# Patient Record
Sex: MALE | Race: OTHER | NOT HISPANIC OR LATINO | URBAN - METROPOLITAN AREA
[De-identification: names, ages, dates, MRNs, and addresses within clinical notes are randomized per-mention and may not be internally consistent; named-entity substitution may affect disease eponyms.]

---

## 2023-07-16 ENCOUNTER — EMERGENCY (EMERGENCY)
Facility: HOSPITAL | Age: 53
LOS: 0 days | Discharge: HOME | End: 2023-07-16
Attending: EMERGENCY MEDICINE
Payer: MEDICARE

## 2023-07-16 VITALS
DIASTOLIC BLOOD PRESSURE: 105 MMHG | HEART RATE: 85 BPM | TEMPERATURE: 98 F | OXYGEN SATURATION: 99 % | RESPIRATION RATE: 20 BRPM | SYSTOLIC BLOOD PRESSURE: 170 MMHG

## 2023-07-16 DIAGNOSIS — Z87.19 PERSONAL HISTORY OF OTHER DISEASES OF THE DIGESTIVE SYSTEM: ICD-10-CM

## 2023-07-16 DIAGNOSIS — M54.50 LOW BACK PAIN, UNSPECIFIED: ICD-10-CM

## 2023-07-16 DIAGNOSIS — S39.012A STRAIN OF MUSCLE, FASCIA AND TENDON OF LOWER BACK, INITIAL ENCOUNTER: ICD-10-CM

## 2023-07-16 DIAGNOSIS — Z87.39 PERSONAL HISTORY OF OTHER DISEASES OF THE MUSCULOSKELETAL SYSTEM AND CONNECTIVE TISSUE: ICD-10-CM
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
## 2023-07-16 DIAGNOSIS — M43.06 SPONDYLOLYSIS, LUMBAR REGION: ICD-10-CM
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
## 2023-07-16 PROCEDURE — 99283 EMERGENCY DEPT VISIT LOW MDM: CPT | Mod: 25

## 2023-07-16 PROCEDURE — 72110 X-RAY EXAM L-2 SPINE 4/>VWS: CPT

## 2023-07-16 PROCEDURE — 99284 EMERGENCY DEPT VISIT MOD MDM: CPT | Mod: GC

## 2023-07-16 PROCEDURE — 72110 X-RAY EXAM L-2 SPINE 4/>VWS: CPT | Mod: 26

## 2023-07-16 RX ORDER — METHOCARBAMOL 500 MG/1
1500 TABLET, FILM COATED ORAL ONCE
Refills: 0 | Status: DISCONTINUED | OUTPATIENT
Start: 2023-07-16 | End: 2023-07-16

## 2023-07-16 RX ORDER — KETOROLAC TROMETHAMINE 30 MG/ML
1 SYRINGE (ML) INJECTION
Qty: 14 | Refills: 0
Start: 2023-07-16 | End: 2023-07-22

## 2023-07-16 RX ORDER — LIDOCAINE 4 G/100G
1 CREAM TOPICAL ONCE
Refills: 0 | Status: DISCONTINUED | OUTPATIENT
Start: 2023-07-16 | End: 2023-07-16

## 2023-07-16 RX ORDER — KETOROLAC TROMETHAMINE 30 MG/ML
15 SYRINGE (ML) INJECTION ONCE
Refills: 0 | Status: COMPLETED | OUTPATIENT
Start: 2023-07-16 | End: 2023-07-16

## 2023-07-16 RX ORDER — METHOCARBAMOL 500 MG/1
2 TABLET, FILM COATED ORAL
Qty: 28 | Refills: 0
Start: 2023-07-16 | End: 2023-07-22

## 2023-07-16 RX ORDER — MENTHOL 16 G/100G
1 CREAM TOPICAL
Qty: 1 | Refills: 0
Start: 2023-07-16 | End: 2023-07-22

## 2023-07-16 RX ORDER — ACETAMINOPHEN 500 MG
975 TABLET ORAL ONCE
Refills: 0 | Status: DISCONTINUED | OUTPATIENT
Start: 2023-07-16 | End: 2023-07-16

## 2023-07-16 NOTE — ED PROVIDER NOTE - ATTENDING CONTRIBUTION TO CARE
52yM spondylolysis s/p repair 5/10 at [facility] p/w L lower back pain occ radiating down his L leg.  No fever, incontinence or saddle anesthesia.  No new traumatic injury or heavy lifting.

## 2023-07-16 NOTE — ED PROVIDER NOTE - OBJECTIVE STATEMENT
Patient is a 52-year-old male history of herniated disks spondylolysis inguinal hernia status postrepair May 10 at the [facility] presented ED for evaluation of left lower back pain radaiting down his left leg.  Patient states he does not have any pain medication to take at home presents for help with pain management.  Denies any fecal urinary incontinence no saddle anesthesia.  No new trauma. Otherwise denies any fever, chills, headache, changes in vision, cough, congestion, cp, palpitations, sob, n/v/d, abd pain, constipation, urinary complaints, lower extremity pain/swelling. Patient is a 52-year-old male history of herniated disks spondylolysis inguinal hernia status postrepair May 10 at the [facility] presented ED for evaluation of left lower back pain radiating down his left leg.  Patient states he does not have any pain medication to take at home presents for help with pain management.  Denies any fecal urinary incontinence no saddle anesthesia.  No new trauma. Otherwise denies any fever, chills, headache, changes in vision, cough, congestion, cp, palpitations, sob, n/v/d, abd pain, constipation, urinary complaints, lower extremity pain/swelling.

## 2023-07-16 NOTE — ED PROVIDER NOTE - PROGRESS NOTE DETAILS
will give pain meds will give pain meds, lumbosacral xray pk: xray reviewed, pt ambulating, reports significant improvement. will dc

## 2023-07-16 NOTE — ED PROVIDER NOTE - CLINICAL SUMMARY MEDICAL DECISION MAKING FREE TEXT BOX
52yM recent spinal surgery p/w flare of his low back pain.  No focal neuro deficit and no recent trauma.  No red flags to necessitate imaging aside from age >50.  Xray w/o fx or dislocation.  No concern for infected or displaced hardware.  Pt feeling much better after toradol/robaxin.  Recommend supportive care, o/p f/u, return precautions.

## 2023-07-16 NOTE — ED PROVIDER NOTE - PHYSICAL EXAMINATION
CONSTITUTIONAL: well-appearing, in NAD  SKIN: Warm dry, normal skin turgor  HEAD: NCAT  EYES: EOMI, PERRLA, no scleral icterus, conjunctiva pink  ENT: normal pharynx with no erythema or exudates  NECK: Supple; non tender. Full ROM.  CARD: RRR, no murmurs.  RESP: clear to ausculation b/l. No crackles or wheezing.  ABD: soft, non-tender, non-distended, no rebound or guarding.  EXT: Full ROM, no bony tenderness, no pedal edema, no calf tenderness  BACK: left sided lumbosacral paravertebral tenderness   NEURO: normal motor. normal sensory. CN II-XII intact.   PSYCH: Cooperative, appropriate.

## 2023-07-16 NOTE — ED PROVIDER NOTE - NSFOLLOWUPINSTRUCTIONS_ED_ALL_ED_FT
Our Emergency Department Referral Coordinators will be reaching out to you in the next 24-48 hours from 9:00am to 5:00pm with a follow up appointment. Please expect a phone call from the hospital in that time frame. If you do not receive a call or if you have any questions or concerns, you can reach them at   [phone]      ACUTE LOW BACK PAIN - AfterCare(R) Instructions(ER/ED)     Acute Low Back Pain    WHAT YOU NEED TO KNOW:    Acute low back pain is sudden discomfort in your lower back area that lasts for up to 6 weeks. The discomfort makes it difficult to tolerate activity.          DISCHARGE INSTRUCTIONS:    Return to the emergency department if:     You have severe pain.      You have sudden stiffness and heaviness on both buttocks down to both legs.      You have numbness or weakness in one leg, or pain in both legs.      You have numbness in your genital area or across your lower back.      You cannot control your urine or bowel movements.    Contact your healthcare provider if:     You have a fever.      You have pain at night or when you rest.      Your pain does not get better with treatment.      You have pain that worsens when you cough or sneeze.      You suddenly feel something pop or snap in your back.      You have questions or concerns about your condition or care.    Medicines: You may need any of the following:     NSAIDs help decrease swelling and pain. This medicine is available with or without a doctor's order. NSAIDs can cause stomach bleeding or kidney problems in certain people. If you take blood thinner medicine, always ask your healthcare provider if NSAIDs are safe for you. Always read the medicine label and follow directions.      Acetaminophen decreases pain and fever. It is available without a doctor's order. Ask how much to take and how often to take it. Follow directions. Read the labels of all other medicines you are using to see if they also contain acetaminophen, or ask your doctor or pharmacist. Acetaminophen can cause liver damage if not taken correctly. Do not use more than 4 grams (4,000 milligrams) total of acetaminophen in one day.       Muscle relaxers decrease pain by relaxing the muscles in your lower spine.      Prescription pain medicine may be given. Ask your healthcare provider how to take this medicine safely. Some prescription pain medicines contain acetaminophen. Do not take other medicines that contain acetaminophen without talking to your healthcare provider. Too much acetaminophen may cause liver damage. Prescription pain medicine may cause constipation. Ask your healthcare provider how to prevent or treat constipation.     Self-care:     Stay active as much as you can without causing more pain. Bed rest could make your back pain worse. Start with some light exercises, such as walking. Avoid heavy lifting until your pain is gone. Ask for more information about the activities or exercises that are right for you.       Apply ice on your back for 15 to 20 minutes every hour or as directed. Use an ice pack, or put crushed ice in a plastic bag. Cover it with a towel before you apply it to your skin. Ice helps prevent tissue damage and decreases swelling and pain.       Apply heat on your back for 20 to 30 minutes every 2 hours for as many days as directed. Heat helps decrease pain and muscle spasms. Alternate heat and ice.    Prevent acute low back pain:     Use proper body mechanics.   Bend at the hips and knees when you  objects. Do not bend from the waist. Use your leg muscles as you lift the load. Do not use your back. Keep the object close to your chest as you lift it. Try not to twist or lift anything above your waist.      Change your position often when you stand for long periods of time. Rest one foot on a small box or footrest, and then switch to the other foot often.      Try not to sit for long periods of time. When you do, sit in a straight-backed chair with your feet flat on the floor. Never reach, pull, or push while you are sitting.      Do exercises that strengthen your back muscles. Warm up before you exercise. Ask your healthcare provider the best exercises for you.      Maintain a healthy weight. Ask your healthcare provider how much you should weigh. Ask him or her to help you create a weight loss plan if you are overweight.    Follow up with your healthcare provider as directed: Return for a follow-up visit if you still have pain after 1 to 3 weeks of treatment. You may need to visit an orthopedist if your back pain lasts longer than 12 weeks. Write down your questions so you remember to ask them during your visits.

## 2023-07-16 NOTE — ED PROVIDER NOTE - NSFOLLOWUPCLINICS_GEN_ALL_ED_FT
Neurosurgery at [facility]  Neurosurgery  [address] [place] [address], [address]  [place] [address]  Phone: [phone]  Fax:   Follow Up Time: Routine

## 2023-07-16 NOTE — ED PROVIDER NOTE - PATIENT PORTAL LINK FT
You can access the FollowMyHealth Patient Portal offered by [facility] by registering at the following website: [email or web address]. By joining [employer]’s FollowMyHealth portal, you will also be able to view your health information using other applications (apps) compatible with our system.

## 2023-10-22 ENCOUNTER — EMERGENCY (EMERGENCY)
Facility: HOSPITAL | Age: 53
LOS: 0 days | Discharge: HOME | End: 2023-10-22
Attending: EMERGENCY MEDICINE
Payer: MEDICAID

## 2023-10-22 VITALS
TEMPERATURE: 98 F | DIASTOLIC BLOOD PRESSURE: 109 MMHG | SYSTOLIC BLOOD PRESSURE: 186 MMHG | HEART RATE: 81 BPM | OXYGEN SATURATION: 98 % | WEIGHT: 169.98 LBS | RESPIRATION RATE: 18 BRPM

## 2023-10-22 VITALS — SYSTOLIC BLOOD PRESSURE: 155 MMHG | DIASTOLIC BLOOD PRESSURE: 100 MMHG

## 2023-10-22 DIAGNOSIS — F17.200 NICOTINE DEPENDENCE, UNSPECIFIED, UNCOMPLICATED: ICD-10-CM

## 2023-10-22 DIAGNOSIS — M54.50 LOW BACK PAIN, UNSPECIFIED: ICD-10-CM

## 2023-10-22 DIAGNOSIS — M79.605 PAIN IN LEFT LEG: ICD-10-CM

## 2023-10-22 DIAGNOSIS — Z98.890 OTHER SPECIFIED POSTPROCEDURAL STATES: ICD-10-CM

## 2023-10-22 DIAGNOSIS — Z87.39 PERSONAL HISTORY OF OTHER DISEASES OF THE MUSCULOSKELETAL SYSTEM AND CONNECTIVE TISSUE: ICD-10-CM

## 2023-10-22 DIAGNOSIS — I10 ESSENTIAL (PRIMARY) HYPERTENSION: ICD-10-CM

## 2023-10-22 DIAGNOSIS — Z87.19 PERSONAL HISTORY OF OTHER DISEASES OF THE DIGESTIVE SYSTEM: ICD-10-CM

## 2023-10-22 PROCEDURE — 99284 EMERGENCY DEPT VISIT MOD MDM: CPT

## 2023-10-22 PROCEDURE — 99283 EMERGENCY DEPT VISIT LOW MDM: CPT | Mod: 25

## 2023-10-22 PROCEDURE — 96372 THER/PROPH/DIAG INJ SC/IM: CPT

## 2023-10-22 RX ORDER — IBUPROFEN 200 MG
1 TABLET ORAL
Qty: 21 | Refills: 0
Start: 2023-10-22 | End: 2023-10-28

## 2023-10-22 RX ORDER — KETOROLAC TROMETHAMINE 30 MG/ML
60 SYRINGE (ML) INJECTION ONCE
Refills: 0 | Status: DISCONTINUED | OUTPATIENT
Start: 2023-10-22 | End: 2023-10-22

## 2023-10-22 RX ORDER — LIDOCAINE 4 G/100G
1 CREAM TOPICAL
Qty: 4 | Refills: 0
Start: 2023-10-22 | End: 2023-11-10

## 2023-10-22 RX ORDER — METHOCARBAMOL 500 MG/1
1 TABLET, FILM COATED ORAL
Qty: 21 | Refills: 0
Start: 2023-10-22 | End: 2023-10-28

## 2023-10-22 RX ORDER — DEXAMETHASONE 0.5 MG/5ML
10 ELIXIR ORAL ONCE
Refills: 0 | Status: COMPLETED | OUTPATIENT
Start: 2023-10-22 | End: 2023-10-22

## 2023-10-22 RX ORDER — LIDOCAINE 4 G/100G
1 CREAM TOPICAL ONCE
Refills: 0 | Status: COMPLETED | OUTPATIENT
Start: 2023-10-22 | End: 2023-10-22

## 2023-10-22 RX ORDER — METHOCARBAMOL 500 MG/1
1000 TABLET, FILM COATED ORAL ONCE
Refills: 0 | Status: COMPLETED | OUTPATIENT
Start: 2023-10-22 | End: 2023-10-22

## 2023-10-22 RX ADMIN — METHOCARBAMOL 1000 MILLIGRAM(S): 500 TABLET, FILM COATED ORAL at 14:01

## 2023-10-22 RX ADMIN — Medication 10 MILLIGRAM(S): at 14:01

## 2023-10-22 RX ADMIN — Medication 60 MILLIGRAM(S): at 14:02

## 2023-10-22 RX ADMIN — LIDOCAINE 1 PATCH: 4 CREAM TOPICAL at 14:02

## 2023-10-22 NOTE — ED PROVIDER NOTE - NSFOLLOWUPINSTRUCTIONS_ED_ALL_ED_FT
Our Emergency Department Referral Coordinators will be reaching out to you in the next 24-48 hours from 9:00am to 5:00pm with a follow up appointment. Please expect a phone call from the hospital in that time frame. If you do not receive a call or if you have any questions or concerns, you can reach them at   [phone]    Back Pain    Back pain is very common in adults. The cause of back pain is rarely dangerous and the pain often gets better over time. The cause of your back pain may not be known and may include strain of muscles or ligaments, degeneration of the spinal disks, or arthritis. Occasionally the pain may radiate down your leg(s). Over-the-counter medicines to reduce pain and inflammation are often the most helpful. Stretching and remaining active frequently helps the healing process.     SEEK IMMEDIATE MEDICAL CARE IF YOU HAVE ANY OF THE FOLLOWING SYMPTOMS: bowel or bladder control problems, unusual weakness or numbness in your arms or legs, nausea or vomiting, abdominal pain, fever, dizziness/lightheadedness.

## 2023-10-22 NOTE — ED PROVIDER NOTE - NS ED ATTENDING STATEMENT MOD
This was a shared visit with the MONO. I reviewed and verified the documentation and independently performed the documented:

## 2023-10-22 NOTE — ED PROVIDER NOTE - PATIENT PORTAL LINK FT
You can access the FollowMyHealth Patient Portal offered by [facility] by registering at the following website: [email or web address]. By joining [employer]’s FollowMyHealth portal, you will also be able to view your health information using other applications (apps) compatible with our system.

## 2023-10-22 NOTE — ED PROVIDER NOTE - OBJECTIVE STATEMENT
53-year-old male with history of herniated disks, HTN, hernia repair presents to the ED complaining of lower back pain radiating down left leg intermittently for a couple months worse this week.  Patient states [facility] and diagnosed with herniated disc.  Patient has been taking ibuprofen, gabapentin and muscle relaxant at home.  Patient denies any new trauma, saddle anesthesia, loss of urine or stool, abdominal pain.

## 2023-10-22 NOTE — ED ADULT TRIAGE NOTE - AS TEMP SITE
Have Your Skin Lesions Been Treated?: not been treated Is This A New Presentation, Or A Follow-Up?: Skin Lesions How Severe Is Your Skin Lesion?: mild oral

## 2023-10-22 NOTE — ED PROVIDER NOTE - CLINICAL SUMMARY MEDICAL DECISION MAKING FREE TEXT BOX
Patient with history of sciatica presents with left lower back pain radiating down the posterior leg. Sx's consistent  with sciatica.  Patient has no neurodeficits and ambulatory in ED after medications stable for discharge outpatient follow-up.

## 2023-10-24 PROBLEM — I10 ESSENTIAL (PRIMARY) HYPERTENSION: Chronic | Status: ACTIVE | Noted: 2023-10-22

## 2023-10-24 PROBLEM — M51.26 OTHER INTERVERTEBRAL DISC DISPLACEMENT, LUMBAR REGION: Chronic | Status: ACTIVE | Noted: 2023-10-22

## 2023-10-27 ENCOUNTER — APPOINTMENT (OUTPATIENT)
Dept: NEUROSURGERY | Facility: CLINIC | Age: 53
End: 2023-10-27
Payer: MEDICAID

## 2023-10-27 VITALS — BODY MASS INDEX: 25.76 KG/M2 | WEIGHT: 170 LBS | HEIGHT: 68 IN

## 2023-10-27 DIAGNOSIS — M47.816 SPONDYLOSIS W/OUT MYELOPATHY OR RADICULOPATHY, LUMBAR REGION: ICD-10-CM

## 2023-10-27 PROBLEM — Z00.00 ENCOUNTER FOR PREVENTIVE HEALTH EXAMINATION: Status: ACTIVE | Noted: 2023-10-27

## 2023-10-27 PROCEDURE — 99205 OFFICE O/P NEW HI 60 MIN: CPT

## 2023-10-27 RX ORDER — CYCLOBENZAPRINE HYDROCHLORIDE 10 MG/1
10 TABLET, FILM COATED ORAL EVERY 8 HOURS
Qty: 30 | Refills: 0 | Status: ACTIVE | COMMUNITY
Start: 2023-10-27 | End: 1900-01-01

## 2023-11-10 ENCOUNTER — EMERGENCY (EMERGENCY)
Facility: HOSPITAL | Age: 53
LOS: 0 days | Discharge: HOME | End: 2023-11-10
Attending: STUDENT IN AN ORGANIZED HEALTH CARE EDUCATION/TRAINING PROGRAM
Payer: MEDICAID

## 2023-11-10 VITALS
WEIGHT: 169.98 LBS | HEART RATE: 100 BPM | SYSTOLIC BLOOD PRESSURE: 112 MMHG | HEIGHT: 68 IN | RESPIRATION RATE: 16 BRPM | OXYGEN SATURATION: 95 % | TEMPERATURE: 98 F | DIASTOLIC BLOOD PRESSURE: 80 MMHG

## 2023-11-10 DIAGNOSIS — M25.562 PAIN IN LEFT KNEE: ICD-10-CM

## 2023-11-10 DIAGNOSIS — M25.552 PAIN IN LEFT HIP: ICD-10-CM

## 2023-11-10 DIAGNOSIS — M89.8X5 OTHER SPECIFIED DISORDERS OF BONE, THIGH: ICD-10-CM
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
## 2023-11-10 DIAGNOSIS — M25.532 PAIN IN LEFT WRIST: ICD-10-CM

## 2023-11-10 PROCEDURE — 73110 X-RAY EXAM OF WRIST: CPT | Mod: 26,LT

## 2023-11-10 PROCEDURE — 72170 X-RAY EXAM OF PELVIS: CPT | Mod: 26

## 2023-11-10 PROCEDURE — 99284 EMERGENCY DEPT VISIT MOD MDM: CPT

## 2023-11-10 PROCEDURE — 73552 X-RAY EXAM OF FEMUR 2/>: CPT | Mod: LT

## 2023-11-10 PROCEDURE — 73110 X-RAY EXAM OF WRIST: CPT | Mod: LT

## 2023-11-10 PROCEDURE — 72170 X-RAY EXAM OF PELVIS: CPT

## 2023-11-10 PROCEDURE — 99284 EMERGENCY DEPT VISIT MOD MDM: CPT | Mod: 25

## 2023-11-10 PROCEDURE — 73562 X-RAY EXAM OF KNEE 3: CPT | Mod: LT

## 2023-11-10 PROCEDURE — 73562 X-RAY EXAM OF KNEE 3: CPT | Mod: 26,LT

## 2023-11-10 PROCEDURE — 73552 X-RAY EXAM OF FEMUR 2/>: CPT | Mod: 26,LT

## 2023-11-10 RX ORDER — IBUPROFEN 200 MG
600 TABLET ORAL ONCE
Refills: 0 | Status: COMPLETED | OUTPATIENT
Start: 2023-11-10 | End: 2023-11-10

## 2023-11-10 RX ORDER — METHOCARBAMOL 500 MG/1
1500 TABLET, FILM COATED ORAL ONCE
Refills: 0 | Status: COMPLETED | OUTPATIENT
Start: 2023-11-10 | End: 2023-11-10

## 2023-11-10 RX ORDER — METHOCARBAMOL 500 MG/1
2 TABLET, FILM COATED ORAL
Qty: 42 | Refills: 0
Start: 2023-11-10 | End: 2023-11-16

## 2023-11-10 RX ORDER — ACETAMINOPHEN 500 MG
975 TABLET ORAL ONCE
Refills: 0 | Status: COMPLETED | OUTPATIENT
Start: 2023-11-10 | End: 2023-11-10

## 2023-11-10 RX ADMIN — Medication 975 MILLIGRAM(S): at 18:40

## 2023-11-10 RX ADMIN — Medication 600 MILLIGRAM(S): at 18:40

## 2023-11-10 RX ADMIN — METHOCARBAMOL 1500 MILLIGRAM(S): 500 TABLET, FILM COATED ORAL at 18:40

## 2023-11-10 NOTE — ED PROVIDER NOTE - CARE PROVIDER_API CALL
Eduardo Parkinson  Neurosurgery  [address] [place] [address], [address]  [place] [address]  Phone: [phone]  Fax: [phone]  Follow Up Time: 4-6 Days

## 2023-11-10 NOTE — ED PROVIDER NOTE - NS ED ATTENDING STATEMENT MOD
today
This was a shared visit with the MONO. I reviewed and verified the documentation and independently performed the documented:

## 2023-11-10 NOTE — ED PROVIDER NOTE - CLINICAL SUMMARY MEDICAL DECISION MAKING FREE TEXT BOX
53-year-old male, past medical history of sciatica, for mechanical down 8 stairs on his left side.  He complains of left hip and knee pain, nonradiating worse with use.  He has been having difficulty ambulating and notes that he feels the most pain in his left hip joint. Imaging ordered and reviewed. Medications ordered and effects reassessed. Offered patient thumb spika spint for left scaphoid pain and tenderness but patient refused.   Recommended ct pelvis as patient was having antalgic gait and persistent left groin pain although pelvis xray was negative. Patient declined. He has established follow up with neurosurgery. Given strict return precautions and follow up outpatient. Patient has established follow up with Dr. Parkinson coming up.

## 2023-11-10 NOTE — ED ADULT NURSE NOTE - CAS ELECT INFOMATION PROVIDED
Pt left without discharge papers stating he "wasn't waiting anymore and didn't need papers" . pt A&ox4./Other

## 2023-11-10 NOTE — ED PROVIDER NOTE - OBJECTIVE STATEMENT
53-year-old male with a past medical history of sciatica, hypertension presents to the emergency department for evaluation status post fall.  Patient with a mechanical, nonsyncopal fall from standing down 8 states.  No head strike, LOC or AC use.  Patient states that he fell downstairs on his left side, needed assistance from his wife to stand up, is ambulatory afterwards with pain to left hip and knee.  Requesting x-rays.  No nausea, vomiting, chest pain, shortness of breath, dizziness, headache, back pain.

## 2023-11-10 NOTE — ED PROVIDER NOTE - PROGRESS NOTE DETAILS
JS: Offered patient CT scan of hip, but patient refusing at this time. Xrays of hip unremarkable, will splint thumb as he has snuff box tenderness and give ortho follow up as well as neurosurgery follow up for his sciatica.

## 2023-11-10 NOTE — ED PROVIDER NOTE - CARE PLAN
Principal Discharge DX:	Fall  Secondary Diagnosis:	Left wrist pain  Secondary Diagnosis:	Left hip pain   1

## 2023-11-10 NOTE — ED ADULT NURSE NOTE - NSFALLRISKINTERV_ED_ALL_ED

## 2023-11-10 NOTE — ED ADULT NURSE NOTE - OBJECTIVE STATEMENT
53 year old male presenting to ED s/p slipping on wooden steps and falling down . Pt reports going down on the left side of his body / pain. Reports hx of sciatica

## 2023-11-10 NOTE — ED PROVIDER NOTE - PHYSICAL EXAMINATION
CONST: Well appearing in NAD  EYES: PERRL, EOMI, Sclera and conjunctiva clear.   ENT: Oropharynx normal appearing, no erythema or exudates. Uvula midline.  CARD: Normal S1 S2; Normal rate and rhythm  RESP: Equal BS B/L, No wheezes, rhonchi or rales. No distress  GI: Soft, non-tender, non-distended.  MS: Pain w. L hip flexion. No overt signs of trauma to L LE. No midline spinal tenderness.  SKIN: Warm, dry, no acute rashes.   NEURO: A&Ox3, No focal deficits. Strength 5/5 with no sensory deficits. Antalgic gait

## 2023-11-10 NOTE — ED ADULT NURSE REASSESSMENT NOTE - NS ED NURSE REASSESS COMMENT FT1
Pt refusing to go to CT scan despite encouragement and teaching by RN. Pt continuing to refuse stating "I don't need it, im not doing it". KELI Cheung made aware.

## 2023-11-10 NOTE — ED ADULT TRIAGE NOTE - CHIEF COMPLAINT QUOTE
Patient presents to ED s/p slip and fall down 1 flight of stairs on left side of body. Reports hx of sciatica, worsening after fall. Patient ambulatory in triage.

## 2023-11-10 NOTE — ED PROVIDER NOTE - TEST CONSIDERED BUT NOT PERFORMED
Tests Considered But Not Performed recommended ct pelvis as patient was having antalgic gait and persistent left groin pain although pelvis xray was negative. Patient declined. He has established follow up with neurosurgery.

## 2023-11-10 NOTE — ED PROVIDER NOTE - ATTENDING APP SHARED VISIT CONTRIBUTION OF CARE
53-year-old male, past medical history of sciatica, for mechanical down 8 stairs on his left side.  He complains of left hip and knee pain, nonradiating worse with use.  He has been having difficulty ambulating and notes that he feels the most pain in his left hip joint.  Denies head trauma, AC use, nausea, vomiting, dizziness, chest pain, shortness of breath, abdominal pain.    +pain elicited with flexion of left hip  +antalgic gait

## 2023-11-10 NOTE — ED PROVIDER NOTE - PATIENT PORTAL LINK FT
You can access the FollowMyHealth Patient Portal offered by [facility] by registering at the following website: [email or web address]. By joining [employer]’s FollowMyHealth portal, you will also be able to view your health information using other applications (apps) compatible with our system.

## 2023-11-10 NOTE — ED PROVIDER NOTE - NSFOLLOWUPINSTRUCTIONS_ED_ALL_ED_FT
Our Emergency Department Referral Coordinators will be reaching out to you in the next 24-48 hours from 9:00am to 5:00pm with a follow up appointment. Please expect a phone call from the hospital in that time frame. If you do not receive a call or if you have any questions or concerns, you can reach them at   [phone]    Please follow up with Dr. Parkinson     Wrist Pain, Adult    There are many things that can cause wrist pain. Some common causes include:    An injury to the wrist area, such as a sprain, strain, or fracture.  Overuse of the joint.  A condition that causes increased pressure on a nerve in the wrist (carpal tunnel syndrome).  Wear and tear of the joints that occurs with aging (osteoarthritis).  A variety of other types of arthritis.    Sometimes, the cause of wrist pain is not known. Often, the pain goes away when you follow instructions from your health care provider for relieving pain at home, such as resting or icing the wrist. If your wrist pain continues, it is important to tell your health care provider.    Follow these instructions at home:  Rest the wrist area for at least 48 hours or as long as told by your health care provider.  If a splint or elastic bandage has been applied, use it as told by your health care provider.    Remove the splint or bandage only as told by your health care provider.  Loosen the splint or bandage if your fingers tingle, become numb, or turn cold or blue.    ImageIf directed, apply ice to the injured area.    If you have a removable splint or elastic bandage, remove it as told by your health care provider.  Put ice in a plastic bag.  Place a towel between your skin and the bag or between your splint or bandage and the bag.  Leave the ice on for 20 minutes, 2–3 times a day.    Keep your arm raised (elevated) above the level of your heart while you are sitting or lying down.  Take over-the-counter and prescription medicines only as told by your health care provider.  Keep all follow-up visits as told by your health care provider. This is important.  Contact a health care provider if:  You have a sudden sharp pain in the wrist, hand, or arm that is different or new.  The swelling or bruising on your wrist or hand gets worse.  Your skin becomes red, gets a rash, or has open sores.  Your pain does not get better or it gets worse.  Get help right away if:  You lose feeling in your fingers or hand.  Your fingers turn white, very red, or cold and blue.  You cannot move your fingers.  You have a fever or chills.  This information is not intended to replace advice given to you by your health care provider. Make sure you discuss any questions you have with your health care provider.

## 2023-12-29 ENCOUNTER — APPOINTMENT (OUTPATIENT)
Dept: NEUROSURGERY | Facility: CLINIC | Age: 53
End: 2023-12-29

## 2024-02-26 ENCOUNTER — NON-APPOINTMENT (OUTPATIENT)
Age: 54
End: 2024-02-26

## 2024-03-19 ENCOUNTER — APPOINTMENT (OUTPATIENT)
Dept: NEUROSURGERY | Facility: CLINIC | Age: 54
End: 2024-03-19

## 2024-07-09 ENCOUNTER — INPATIENT (INPATIENT)
Facility: HOSPITAL | Age: 54
LOS: 0 days | Discharge: LEFT BEFORE DISCHARGE | DRG: 916 | End: 2024-07-10
Attending: INTERNAL MEDICINE | Admitting: INTERNAL MEDICINE
Payer: MEDICARE

## 2024-07-09 VITALS
WEIGHT: 169.98 LBS | RESPIRATION RATE: 18 BRPM | SYSTOLIC BLOOD PRESSURE: 131 MMHG | OXYGEN SATURATION: 97 % | TEMPERATURE: 99 F | DIASTOLIC BLOOD PRESSURE: 86 MMHG | HEART RATE: 91 BPM

## 2024-07-09 DIAGNOSIS — T78.3XXA ANGIONEUROTIC EDEMA, INITIAL ENCOUNTER: ICD-10-CM

## 2024-07-09 LAB
ALBUMIN SERPL ELPH-MCNC: 4 G/DL — SIGNIFICANT CHANGE UP (ref 3.5–5.2)
ALBUMIN SERPL ELPH-MCNC: 4.4 G/DL — SIGNIFICANT CHANGE UP (ref 3.5–5.2)
ALP SERPL-CCNC: 106 U/L — SIGNIFICANT CHANGE UP (ref 30–115)
ALP SERPL-CCNC: 108 U/L — SIGNIFICANT CHANGE UP (ref 30–115)
ALT FLD-CCNC: 23 U/L — SIGNIFICANT CHANGE UP (ref 0–41)
ALT FLD-CCNC: 25 U/L — SIGNIFICANT CHANGE UP (ref 0–41)
ANION GAP SERPL CALC-SCNC: 14 MMOL/L — SIGNIFICANT CHANGE UP (ref 7–14)
ANION GAP SERPL CALC-SCNC: 16 MMOL/L — HIGH (ref 7–14)
AST SERPL-CCNC: 56 U/L — HIGH (ref 0–41)
AST SERPL-CCNC: 59 U/L — HIGH (ref 0–41)
BASOPHILS # BLD AUTO: 0.04 K/UL — SIGNIFICANT CHANGE UP (ref 0–0.2)
BASOPHILS NFR BLD AUTO: 0.4 % — SIGNIFICANT CHANGE UP (ref 0–1)
BILIRUB DIRECT SERPL-MCNC: 0.2 MG/DL — SIGNIFICANT CHANGE UP (ref 0–0.3)
BILIRUB INDIRECT FLD-MCNC: 0.5 MG/DL — SIGNIFICANT CHANGE UP (ref 0.2–1.2)
BILIRUB SERPL-MCNC: 0.5 MG/DL — SIGNIFICANT CHANGE UP (ref 0.2–1.2)
BILIRUB SERPL-MCNC: 0.7 MG/DL — SIGNIFICANT CHANGE UP (ref 0.2–1.2)
BUN SERPL-MCNC: 11 MG/DL — SIGNIFICANT CHANGE UP (ref 10–20)
BUN SERPL-MCNC: 12 MG/DL — SIGNIFICANT CHANGE UP (ref 10–20)
CALCIUM SERPL-MCNC: 9.3 MG/DL — SIGNIFICANT CHANGE UP (ref 8.4–10.5)
CALCIUM SERPL-MCNC: 9.7 MG/DL — SIGNIFICANT CHANGE UP (ref 8.4–10.5)
CHLORIDE SERPL-SCNC: 96 MMOL/L — LOW (ref 98–110)
CHLORIDE SERPL-SCNC: 96 MMOL/L — LOW (ref 98–110)
CO2 SERPL-SCNC: 22 MMOL/L — SIGNIFICANT CHANGE UP (ref 17–32)
CO2 SERPL-SCNC: 23 MMOL/L — SIGNIFICANT CHANGE UP (ref 17–32)
CREAT SERPL-MCNC: 0.8 MG/DL — SIGNIFICANT CHANGE UP (ref 0.7–1.5)
CREAT SERPL-MCNC: 0.8 MG/DL — SIGNIFICANT CHANGE UP (ref 0.7–1.5)
EGFR: 105 ML/MIN/1.73M2 — SIGNIFICANT CHANGE UP
EGFR: 105 ML/MIN/1.73M2 — SIGNIFICANT CHANGE UP
EOSINOPHIL # BLD AUTO: 0.02 K/UL — SIGNIFICANT CHANGE UP (ref 0–0.7)
EOSINOPHIL NFR BLD AUTO: 0.2 % — SIGNIFICANT CHANGE UP (ref 0–8)
GLUCOSE BLDC GLUCOMTR-MCNC: 104 MG/DL — HIGH (ref 70–99)
GLUCOSE SERPL-MCNC: 112 MG/DL — HIGH (ref 70–99)
GLUCOSE SERPL-MCNC: 91 MG/DL — SIGNIFICANT CHANGE UP (ref 70–99)
HCT VFR BLD CALC: 38.7 % — LOW (ref 42–52)
HGB BLD-MCNC: 13.3 G/DL — LOW (ref 14–18)
IMM GRANULOCYTES NFR BLD AUTO: 0.8 % — HIGH (ref 0.1–0.3)
LYMPHOCYTES # BLD AUTO: 1.91 K/UL — SIGNIFICANT CHANGE UP (ref 1.2–3.4)
LYMPHOCYTES # BLD AUTO: 18 % — LOW (ref 20.5–51.1)
MAGNESIUM SERPL-MCNC: 1.4 MG/DL — LOW (ref 1.8–2.4)
MCHC RBC-ENTMCNC: 32.4 PG — HIGH (ref 27–31)
MCHC RBC-ENTMCNC: 34.4 G/DL — SIGNIFICANT CHANGE UP (ref 32–37)
MCV RBC AUTO: 94.2 FL — HIGH (ref 80–94)
MONOCYTES # BLD AUTO: 0.69 K/UL — HIGH (ref 0.1–0.6)
MONOCYTES NFR BLD AUTO: 6.5 % — SIGNIFICANT CHANGE UP (ref 1.7–9.3)
NEUTROPHILS # BLD AUTO: 7.89 K/UL — HIGH (ref 1.4–6.5)
NEUTROPHILS NFR BLD AUTO: 74.1 % — SIGNIFICANT CHANGE UP (ref 42.2–75.2)
NRBC # BLD: 0 /100 WBCS — SIGNIFICANT CHANGE UP (ref 0–0)
PHOSPHATE SERPL-MCNC: 3.7 MG/DL — SIGNIFICANT CHANGE UP (ref 2.1–4.9)
PLATELET # BLD AUTO: 272 K/UL — SIGNIFICANT CHANGE UP (ref 130–400)
PMV BLD: 9 FL — SIGNIFICANT CHANGE UP (ref 7.4–10.4)
POTASSIUM SERPL-MCNC: 4.3 MMOL/L — SIGNIFICANT CHANGE UP (ref 3.5–5)
POTASSIUM SERPL-MCNC: 5.4 MMOL/L — HIGH (ref 3.5–5)
POTASSIUM SERPL-SCNC: 4.3 MMOL/L — SIGNIFICANT CHANGE UP (ref 3.5–5)
POTASSIUM SERPL-SCNC: 5.4 MMOL/L — HIGH (ref 3.5–5)
PROT SERPL-MCNC: 7.2 G/DL — SIGNIFICANT CHANGE UP (ref 6–8)
PROT SERPL-MCNC: 7.6 G/DL — SIGNIFICANT CHANGE UP (ref 6–8)
RBC # BLD: 4.11 M/UL — LOW (ref 4.7–6.1)
RBC # FLD: 13.5 % — SIGNIFICANT CHANGE UP (ref 11.5–14.5)
SODIUM SERPL-SCNC: 132 MMOL/L — LOW (ref 135–146)
SODIUM SERPL-SCNC: 135 MMOL/L — SIGNIFICANT CHANGE UP (ref 135–146)
WBC # BLD: 10.63 K/UL — SIGNIFICANT CHANGE UP (ref 4.8–10.8)
WBC # FLD AUTO: 10.63 K/UL — SIGNIFICANT CHANGE UP (ref 4.8–10.8)

## 2024-07-09 PROCEDURE — 80048 BASIC METABOLIC PNL TOTAL CA: CPT

## 2024-07-09 PROCEDURE — 86160 COMPLEMENT ANTIGEN: CPT

## 2024-07-09 PROCEDURE — 80076 HEPATIC FUNCTION PANEL: CPT

## 2024-07-09 PROCEDURE — 82962 GLUCOSE BLOOD TEST: CPT

## 2024-07-09 PROCEDURE — 86850 RBC ANTIBODY SCREEN: CPT

## 2024-07-09 PROCEDURE — 83735 ASSAY OF MAGNESIUM: CPT

## 2024-07-09 PROCEDURE — 85025 COMPLETE CBC W/AUTO DIFF WBC: CPT

## 2024-07-09 PROCEDURE — 99291 CRITICAL CARE FIRST HOUR: CPT

## 2024-07-09 PROCEDURE — 84100 ASSAY OF PHOSPHORUS: CPT

## 2024-07-09 PROCEDURE — 86161 COMPLEMENT/FUNCTION ACTIVITY: CPT

## 2024-07-09 PROCEDURE — 71045 X-RAY EXAM CHEST 1 VIEW: CPT

## 2024-07-09 PROCEDURE — 71045 X-RAY EXAM CHEST 1 VIEW: CPT | Mod: 26

## 2024-07-09 PROCEDURE — 83520 IMMUNOASSAY QUANT NOS NONAB: CPT

## 2024-07-09 PROCEDURE — 80053 COMPREHEN METABOLIC PANEL: CPT

## 2024-07-09 PROCEDURE — 86900 BLOOD TYPING SEROLOGIC ABO: CPT

## 2024-07-09 PROCEDURE — 86901 BLOOD TYPING SEROLOGIC RH(D): CPT

## 2024-07-09 PROCEDURE — 36415 COLL VENOUS BLD VENIPUNCTURE: CPT

## 2024-07-09 PROCEDURE — 99223 1ST HOSP IP/OBS HIGH 75: CPT

## 2024-07-09 PROCEDURE — 99221 1ST HOSP IP/OBS SF/LOW 40: CPT

## 2024-07-09 RX ORDER — DEXTROSE MONOHYDRATE AND SODIUM CHLORIDE 5; .3 G/100ML; G/100ML
1000 INJECTION, SOLUTION INTRAVENOUS ONCE
Refills: 0 | Status: COMPLETED | OUTPATIENT
Start: 2024-07-09 | End: 2024-07-09

## 2024-07-09 RX ORDER — METHYLPREDNISOLONE ACETATE 20 MG/ML
125 VIAL (ML) INJECTION ONCE
Refills: 0 | Status: COMPLETED | OUTPATIENT
Start: 2024-07-09 | End: 2024-07-09

## 2024-07-09 RX ORDER — METHOCARBAMOL 500 MG
1500 TABLET ORAL ONCE
Refills: 0 | Status: COMPLETED | OUTPATIENT
Start: 2024-07-09 | End: 2024-07-09

## 2024-07-09 RX ORDER — ENOXAPARIN SODIUM 100 MG/ML
40 INJECTION SUBCUTANEOUS EVERY 24 HOURS
Refills: 0 | Status: DISCONTINUED | OUTPATIENT
Start: 2024-07-09 | End: 2024-07-10

## 2024-07-09 RX ORDER — MAGNESIUM SULFATE 100 %
2 POWDER (GRAM) MISCELLANEOUS
Refills: 0 | Status: COMPLETED | OUTPATIENT
Start: 2024-07-09 | End: 2024-07-10

## 2024-07-09 RX ORDER — ALBUTEROL 90 MCG
2 AEROSOL REFILL (GRAM) INHALATION
Refills: 0 | DISCHARGE

## 2024-07-09 RX ORDER — EPINEPHRINE 0.3 MG/.3ML
0.3 INJECTION SUBCUTANEOUS ONCE
Refills: 0 | Status: COMPLETED | OUTPATIENT
Start: 2024-07-09 | End: 2024-07-09

## 2024-07-09 RX ORDER — FAMOTIDINE 40 MG
20 TABLET ORAL DAILY
Refills: 0 | Status: DISCONTINUED | OUTPATIENT
Start: 2024-07-09 | End: 2024-07-10

## 2024-07-09 RX ORDER — SODIUM ZIRCONIUM CYCLOSILICATE 10 G/10G
5 POWDER, FOR SUSPENSION ORAL ONCE
Refills: 0 | Status: COMPLETED | OUTPATIENT
Start: 2024-07-09 | End: 2024-07-09

## 2024-07-09 RX ORDER — ALBUTEROL 90 MCG
2 AEROSOL REFILL (GRAM) INHALATION EVERY 6 HOURS
Refills: 0 | Status: DISCONTINUED | OUTPATIENT
Start: 2024-07-09 | End: 2024-07-10

## 2024-07-09 RX ORDER — LORAZEPAM 0.5 MG
2 TABLET ORAL
Refills: 0 | Status: DISCONTINUED | OUTPATIENT
Start: 2024-07-09 | End: 2024-07-10

## 2024-07-09 RX ORDER — METHOCARBAMOL 500 MG
1500 TABLET ORAL
Refills: 0 | Status: DISCONTINUED | OUTPATIENT
Start: 2024-07-09 | End: 2024-07-10

## 2024-07-09 RX ORDER — LABETALOL HYDROCHLORIDE 300 MG/1
100 TABLET ORAL
Refills: 0 | Status: DISCONTINUED | OUTPATIENT
Start: 2024-07-09 | End: 2024-07-10

## 2024-07-09 RX ORDER — AMLODIPINE BESYLATE 2.5 MG/1
5 TABLET ORAL DAILY
Refills: 0 | Status: DISCONTINUED | OUTPATIENT
Start: 2024-07-09 | End: 2024-07-10

## 2024-07-09 RX ORDER — BUPRENORPHINE AND NALOXONE 12; 3 MG/1; MG/1
1 FILM BUCCAL; SUBLINGUAL ONCE
Refills: 0 | Status: DISCONTINUED | OUTPATIENT
Start: 2024-07-09 | End: 2024-07-09

## 2024-07-09 RX ORDER — FAMOTIDINE 40 MG
20 TABLET ORAL ONCE
Refills: 0 | Status: COMPLETED | OUTPATIENT
Start: 2024-07-09 | End: 2024-07-09

## 2024-07-09 RX ORDER — TRANEXAMIC ACID 100 MG/ML
1000 INJECTION, SOLUTION INTRAVENOUS ONCE
Refills: 0 | Status: COMPLETED | OUTPATIENT
Start: 2024-07-09 | End: 2024-07-09

## 2024-07-09 RX ORDER — DIPHENHYDRAMINE HCL 12.5MG/5ML
25 ELIXIR ORAL EVERY 4 HOURS
Refills: 0 | Status: DISCONTINUED | OUTPATIENT
Start: 2024-07-09 | End: 2024-07-10

## 2024-07-09 RX ORDER — METHYLPREDNISOLONE ACETATE 20 MG/ML
60 VIAL (ML) INJECTION EVERY 24 HOURS
Refills: 0 | Status: DISCONTINUED | OUTPATIENT
Start: 2024-07-09 | End: 2024-07-10

## 2024-07-09 RX ORDER — DIPHENHYDRAMINE HCL 12.5MG/5ML
50 ELIXIR ORAL ONCE
Refills: 0 | Status: COMPLETED | OUTPATIENT
Start: 2024-07-09 | End: 2024-07-09

## 2024-07-09 RX ORDER — LABETALOL HYDROCHLORIDE 300 MG/1
1 TABLET ORAL
Refills: 0 | DISCHARGE

## 2024-07-09 RX ORDER — DIPHENHYDRAMINE HCL 12.5MG/5ML
25 ELIXIR ORAL ONCE
Refills: 0 | Status: COMPLETED | OUTPATIENT
Start: 2024-07-09 | End: 2024-07-09

## 2024-07-09 RX ADMIN — Medication 60 MILLIGRAM(S): at 18:49

## 2024-07-09 RX ADMIN — EPINEPHRINE 0.3 MILLIGRAM(S): 0.3 INJECTION SUBCUTANEOUS at 12:23

## 2024-07-09 RX ADMIN — Medication 600 MILLIGRAM(S): at 14:04

## 2024-07-09 RX ADMIN — ENOXAPARIN SODIUM 40 MILLIGRAM(S): 100 INJECTION SUBCUTANEOUS at 18:49

## 2024-07-09 RX ADMIN — Medication 20 MILLIGRAM(S): at 20:26

## 2024-07-09 RX ADMIN — Medication 125 MILLIGRAM(S): at 12:23

## 2024-07-09 RX ADMIN — SODIUM ZIRCONIUM CYCLOSILICATE 5 GRAM(S): 10 POWDER, FOR SUSPENSION ORAL at 23:44

## 2024-07-09 RX ADMIN — Medication 25 MILLIGRAM(S): at 20:26

## 2024-07-09 RX ADMIN — LABETALOL HYDROCHLORIDE 100 MILLIGRAM(S): 300 TABLET ORAL at 18:20

## 2024-07-09 RX ADMIN — TRANEXAMIC ACID 220 MILLIGRAM(S): 100 INJECTION, SOLUTION INTRAVENOUS at 16:08

## 2024-07-09 RX ADMIN — Medication 20 MILLIGRAM(S): at 12:23

## 2024-07-09 RX ADMIN — Medication 25 GRAM(S): at 23:45

## 2024-07-09 RX ADMIN — BUPRENORPHINE AND NALOXONE 1 TABLET(S): 12; 3 FILM BUCCAL; SUBLINGUAL at 15:39

## 2024-07-09 RX ADMIN — Medication 2 MILLIGRAM(S): at 18:50

## 2024-07-09 RX ADMIN — Medication 2 MILLIGRAM(S): at 23:53

## 2024-07-09 RX ADMIN — AMLODIPINE BESYLATE 5 MILLIGRAM(S): 2.5 TABLET ORAL at 18:20

## 2024-07-09 RX ADMIN — Medication 50 MILLIGRAM(S): at 12:23

## 2024-07-09 RX ADMIN — DEXTROSE MONOHYDRATE AND SODIUM CHLORIDE 1000 MILLILITER(S): 5; .3 INJECTION, SOLUTION INTRAVENOUS at 12:23

## 2024-07-09 RX ADMIN — Medication 1500 MILLIGRAM(S): at 14:03

## 2024-07-09 NOTE — ED ADULT NURSE NOTE - NSFALLUNIVINTERV_ED_ALL_ED
Bed/Stretcher in lowest position, wheels locked, appropriate side rails in place/Call bell, personal items and telephone in reach/Instruct patient to call for assistance before getting out of bed/chair/stretcher/Non-slip footwear applied when patient is off stretcher/[place] to call system/Physically safe environment - no spills, clutter or unnecessary equipment/Purposeful proactive rounding/Room/bathroom lighting operational, light cord in reach

## 2024-07-09 NOTE — CONSULT NOTE ADULT - ATTENDING COMMENTS
IMPRESSION:    Angioedema without urticaria, worsening - suspected Lisinopril induced  No evidence of airway compromise at this point   COPD, stable  Active smoker  HO HTN      Plan as outlined above

## 2024-07-09 NOTE — ED PROVIDER NOTE - PHYSICAL EXAMINATION
VITAL SIGNS: I have reviewed nursing notes and confirm.  CONSTITUTIONAL: well-appearing, non-toxic, NAD  SKIN: Warm dry, normal skin turgor, no acute rash, no bruising  HEAD: NCAT  EYES: EOMI, PERRLA, no scleral icterus, normal conjunctiva  ENT: Moist mucous membranes, Right lower face edema present, nontender, lower lip edema present, no blistering or drainage, nontender, no dental pain, swelling or discharge, Uvula edema present, left tonsillar pillar edema present. Speaking in full sentences  NECK: Supple; non tender. Full ROM. No cervical LAD  CARD: RRR, no murmurs, rubs or gallops  RESP: clear to ausculation b/l.  No rales, rhonchi, or wheezing. No increased WOB.  ABD: soft, + BS, non-tender, non-distended, no rebound or guarding. No CVA tenderness  EXT: Full ROM, no bony tenderness, pulses intact in bilateral UE and LE, no pedal edema, no calf tenderness  NEURO: normal motor. normal sensory. Normal gait.  PSYCH: Cooperative, appropriate.

## 2024-07-09 NOTE — H&P ADULT - HISTORY OF PRESENT ILLNESS
At 7AM felt a small blister in mouth and then after looked at the mirror and saw swelling in R cheek mouth and lips. Attempted some rinsing with peroxide and salt and didn't alleviate. When he realized how swollen, he came to the ED.  No chest pain, palpitations, chest pain, fevers, chills, NVDC, no dysuria, no skin rashes, no known insect bites. No pain in the cheek, mouth. No weakness, dizziness, tremors. Has recent congestion and cough productive with green colored sputum.  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 40 pack years of smoking, heavy alcohol use 6-8 9% beers daily. Last drink before coming to the hospital. No drugs or marijuana use. Additionally, using kratom and occasionally suboxone for pain management. Takes lisinopril for 20 years.    Vitals - 131/ 86, HR 91, RR18, Temp 98.6, satting 97% on RA  Labs- WBC 10, Hgb 13.3, Na 132, K 4.3, Cr 0.8  In the ED, given benadryl 50, epi 0.3, pepcid 20, solumedrol 125, 1 L bolus, robaxin, motrin, buprenorphine/ naloxone, TXA,  This is a 52 y/o M with a PMHx of HTN on Lisinopril, COPD on albuterol PRN who presented to the ED for facial swelling. At 7AM felt a small blister in mouth and then after looked at the mirror and saw swelling in R cheek mouth and lips. Attempted some rinsing with peroxide and salt and didn't alleviate. When he realized how swollen, he came to the ED.  No chest pain, palpitations, chest pain, fevers, chills, NVDC, no dysuria, no skin rashes, no known insect bites. No pain in the cheek, mouth. No weakness, dizziness, tremors. Has recent congestion and cough productive with green colored sputum.  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 40 pack years of smoking, heavy alcohol use 6-8 9% beers daily. Last drink before coming to the hospital. No drugs or marijuana use. Additionally, using kratom and occasionally suboxone for pain management. Takes lisinopril for 20 years.    Vitals - 131/ 86, HR 91, RR18, Temp 98.6, satting 97% on RA  Labs- WBC 10, Hgb 13.3, Na 132, K 4.3, Cr 0.8  In the ED, given benadryl 50, epi 0.3, pepcid 20, solumedrol 125, 1 L bolus, robaxin, motrin, buprenorphine/ naloxone, TXA,

## 2024-07-09 NOTE — H&P ADULT - ASSESSMENT
Angioedema without urticaria, worsening - suspected Lisinopril induced  No evidence of airway compromise at this point   COPD, stable  Active smoker  HO HTN      PLAN:    CNS: Avoid depressants. Takes OTC Kratom - monitor for withdrawal.    HEENT: Oral care.  No excessive secretions.  S/p Benadryl, Epi, Pepcid and Methylprednisolone.  CW Pepcid, Benadryl and Solumedrol daily.  ENT eval noted - no laryngeal edema.  Tryptase level and C1 esterase testing.  Give 1g TXA now and consider FFP if Angioedema continues to worsen.    PULMONARY:  HOB @ 45 degrees.  Aspiration precautions.  On RA.  Albuterol PRN    CARDIOVASCULAR: Even balance.  DC Lisinopril.  BP control.  Can use Amlodipine     GI: GI prophylaxis w/ Pepcid.  Clears for now.  Bowel regimen PRN    RENAL:  Follow up lytes.  Correct as needed    INFECTIOUS DISEASE: Monitor off Abx    HEMATOLOGICAL:  DVT prophylaxis.      ENDOCRINE:  Follow up FS.  Insulin protocol if needed.    MUSCULOSKELETAL: IAT    Dispo:  MICU     This is a 52 y/o M with a PMHx of HTN on Lisinopril, COPD on albuterol PRN who presented to the ED for facial swelling.    ASSESSMENT:   #Angioedema without urticaria, worsening - suspected Lisinopril induced  #No evidence of airway compromise at this point   #COPD, stable  #Alcohol use disorder  #Active smoker  #HO HTN    PLAN:    CNS: Avoid depressants. Takes OTC Kratom - monitor for withdrawal. Sx triggered CIWA protocol, monitor for alcohol withdrawal    HEENT: Oral care.  No excessive secretions.  S/p Benadryl, Epi, Pepcid and Methylprednisolone.    - CW Pepcid, Benadryl and Solumedrol daily  - ENT eval noted - no laryngeal edema  - f/u Tryptase level and C1 esterase testing  - s/p 1g TXA now and consider FFP if Angioedema continues to worsen    PULMONARY:  HOB @ 45 degrees.  Aspiration precautions.  On RA.  Albuterol PRN    CARDIOVASCULAR: Even balance.  DC Lisinopril.  BP control.  Can use Amlodipine     GI: GI prophylaxis w/ Pepcid.  Clears for now.  Bowel regimen PRN    RENAL:  Follow up lytes.  Correct as needed    INFECTIOUS DISEASE: Monitor off Abx    HEMATOLOGICAL:  DVT prophylaxis.      ENDOCRINE:  Follow up FS.  Insulin protocol if needed.    MUSCULOSKELETAL: IAT    Dispo:  MICU

## 2024-07-09 NOTE — ED PROVIDER NOTE - CLINICAL SUMMARY MEDICAL DECISION MAKING FREE TEXT BOX
Patient admitted for worsening facial swelling presumed angioedema secondary to lisinopril given Benadryl epi famotidine methylprednisone without improvement worsening symptoms are with his lips but not in the oropharynx scoped by ENT Patient monitored for airway compromise given angioedema which was stable.  ICU consulted despite antihistamines and epinephrine symptoms not improved TXA was given patient admitted for further monitoring of the airway

## 2024-07-09 NOTE — H&P ADULT - PATIENT'S SEXUAL ORIENTATION
Anesthesia Post-op Note    Patient: Marilyn Wilder  Procedure(s) Performed: ENDARTERECTOMY; PTA 
Heterosexual

## 2024-07-09 NOTE — CONSULT NOTE ADULT - SUBJECTIVE AND OBJECTIVE BOX
Pt is a 54yo Male who presents with facial swelling for one day. Pt states yesterday he felt some swelling in his mouth, felt a fluid filled blister in his cheek. Noted later on to have swelling of his lower lip on the right side that progressed further to his face. Patient started to feel some scratchiness in his throat as well as some hoarseness. PT denies any difficulty breathing or SOB actively. To note, patient is on Lisinopril - recently noted that the pill shape and color were different.     PAST MEDICAL & SURGICAL HISTORY:  Hypertension  Lumbar herniated disc      MEDICATIONS  (STANDING):  Lisinopril    Allergies    No Known Allergies    Intolerances      REVIEW OF SYSTEMS   [x] A ten-point review of systems was otherwise negative except as noted.    Vital Signs Last 24 Hrs  T(C): 37 (09 Jul 2024 11:49), Max: 37 (09 Jul 2024 11:49)  T(F): 98.6 (09 Jul 2024 11:49), Max: 98.6 (09 Jul 2024 11:49)  HR: 91 (09 Jul 2024 11:49) (91 - 91)  BP: 131/86 (09 Jul 2024 11:49) (131/86 - 131/86)  RR: 18 (09 Jul 2024 11:49) (18 - 18)  SpO2: 97% (09 Jul 2024 11:49) (97% - 97%)    Parameters below as of 09 Jul 2024 11:49  Patient On (Oxygen Delivery Method): room air      GEN: NAD, awake and alert. No drooling or pooling of secretions. No stridor or stertor. Good vocal quality, no hoarseness.   SKIN: Good color, non diaphoretic.  HEENT: + right sided lower lip edema extending to the cheek. no TTP over area, no fluctuance or erythema. Oral mucosa pink and moist. + edema noted to the uvula and left tonsillar space. No erythema or edema noted to buccal mucosa, tongue, FOM. Uvula midline.   NECK: Trachea midline, Neck supple, no TTP to B/L lateral neck, no cervical LAD.  RESP: No dyspnea, non-labored breathing. No use of accessory muscles.   CARDIO: +S1/S2  ABDO: Soft, NT.  EXT: LAMBERT x 4    Fiberoptic Laryngoscopy: No masses or lesions noted to NP/OP/HP. Laryngeal structures intact, no edema or erythema noted. Epiglottis crisp, no edema. TVC/FVC mobile and intact, no glottic gap noted.     LABS:                        13.3   10.63 )-----------( 272      ( 09 Jul 2024 12:23 )             38.7   
Patient is a 53y old  Male who presents with a chief complaint of facial swelling    HPI: Pt is a 52yo Male w/ PMHx of HTN on Lisinopril, COPD on albuterol PRN who presents with facial swelling for one day. Pt states yesterday he felt some swelling in his mouth, felt a fluid filled blister in his cheek. Noted later on to have swelling of his lower lip on the right side that progressed further to his face. Patient started to feel some scratchiness in his throat as well as some hoarseness. PT denies any difficulty breathing or SOB actively. To note, patient is on Lisinopril - recently noted that the pill shape and color were different.       PAST MEDICAL & SURGICAL HISTORY:  Hypertension      Lumbar herniated disc          SOCIAL HX:   Smoking     Active                    ETOH                            Other    FAMILY HISTORY:  :  No known cardiovacular family hisotry     Review Of Systems:     All ROS are negative except per HPI       Allergies    No Known Allergies    Intolerances          PHYSICAL EXAM    ICU Vital Signs Last 24 Hrs  T(C): 37 (09 Jul 2024 11:49), Max: 37 (09 Jul 2024 11:49)  T(F): 98.6 (09 Jul 2024 11:49), Max: 98.6 (09 Jul 2024 11:49)  HR: 91 (09 Jul 2024 11:49) (91 - 91)  BP: 131/86 (09 Jul 2024 11:49) (131/86 - 131/86)  BP(mean): --  ABP: --  ABP(mean): --  RR: 18 (09 Jul 2024 11:49) (18 - 18)  SpO2: 97% (09 Jul 2024 11:49) (97% - 97%)    O2 Parameters below as of 09 Jul 2024 11:49  Patient On (Oxygen Delivery Method): room air            CONSTITUTIONAL:  NAD    ENT:   Lip swelling  No stridor    CARDIAC:   Normal rate,   Regular rhythm.    No edema    RESPIRATORY:   No wheezing  Bilateral BS   Not tachypneic,  No use of accessory muscles    GASTROINTESTINAL:  Abdomen soft,   Non-tender    NEUROLOGICAL:   Alert and oriented   No motor deficits.    SKIN:   No Urticaria              LABS:                          13.3   10.63 )-----------( 272      ( 09 Jul 2024 12:23 )             38.7                                               07-09    132<L>  |  96<L>  |  11  ----------------------------<  91  4.3   |  22  |  0.8    Ca    9.3      09 Jul 2024 12:23    TPro  7.2  /  Alb  4.0  /  TBili  0.5  /  DBili  x   /  AST  59<H>  /  ALT  23  /  AlkPhos  108  07-09                                             Urinalysis Basic - ( 09 Jul 2024 12:23 )    Color: x / Appearance: x / SG: x / pH: x  Gluc: 91 mg/dL / Ketone: x  / Bili: x / Urobili: x   Blood: x / Protein: x / Nitrite: x   Leuk Esterase: x / RBC: x / WBC x   Sq Epi: x / Non Sq Epi: x / Bacteria: x                                                  LIVER FUNCTIONS - ( 09 Jul 2024 12:23 )  Alb: 4.0 g/dL / Pro: 7.2 g/dL / ALK PHOS: 108 U/L / ALT: 23 U/L / AST: 59 U/L / GGT: x                                                                                                                                       X-Rays reviewed                                                                                     ECHO    CXR interpreted by me     MEDICATIONS  (STANDING):  buprenorphine 2 mG/naloxone 0.5 mG SL  Tablet 1 Tablet(s) SubLingual Once    MEDICATIONS  (PRN):

## 2024-07-09 NOTE — PATIENT PROFILE ADULT - DEAF OR HARD OF HEARING?
----- Message from ANDREE Marks sent at 12/15/2021 12:52 PM CST -----  Please let him know his INR is in therapeutic parameters.  Continue same Coumadin.  Repeat PT/INR in 1 month.   yes

## 2024-07-09 NOTE — ED ADULT TRIAGE NOTE - CHIEF COMPLAINT QUOTE
Patient in NAD a+ox3 reports had sardines last night and this morning woke up with swollen mouth and feeling throat swollen + difficulty swallowing

## 2024-07-09 NOTE — PATIENT PROFILE ADULT - FALL HARM RISK - HARM RISK INTERVENTIONS

## 2024-07-09 NOTE — H&P ADULT - NSHPPHYSICALEXAM_GEN_ALL_CORE
GENERAL: NAD, lying in bed comfortably  HENT:  Atraumatic, normocephalic, conjunctiva and sclera clear, right lip edema extending to cheek, uvula midline  HEART: Regular rate and rhythm, no murmurs, rubs, or gallops  LUNGS: Unlabored respirations.  Clear to auscultation bilaterally, no crackles, wheezing, or rhonchi  ABDOMEN: Soft, nontender, nondistended  EXTREMITIES: 2+ peripheral pulses bilaterally. No clubbing, cyanosis, or edema  NERVOUS SYSTEM:  A&Ox3, moving all extremities, no focal deficits   SKIN: No rashes or lesions

## 2024-07-09 NOTE — ED PROVIDER NOTE - OBJECTIVE STATEMENT
53y male with PMHx of HTN on lisinopril who presents for facial swelling since this AM. States he ate sardines yesterday 53y male with PMHx of HTN on lisinopril who presents for facial swelling since this AM. States he ate sardines yesterday. He noticed a small "water blister" on his lower lip that popped on its own. Today, he noticed worsening swelling and right-sided facial swelling. Reports discomfort with swallowing. He was previously allergic to bee stings, but otherwise does not have any other allergies. Denies dental pain, cough, CP, SOB, n/v/d, abdominal pain, weakness, numbness, itchiness, trauma.

## 2024-07-09 NOTE — H&P ADULT - NSHPLABSRESULTS_GEN_ALL_CORE
LABS:                         13.3   10.63 )-----------( 272      ( 09 Jul 2024 12:23 )             38.7     07-09    132<L>  |  96<L>  |  11  ----------------------------<  91  4.3   |  22  |  0.8    Ca    9.3      09 Jul 2024 12:23    TPro  7.2  /  Alb  4.0  /  TBili  0.5  /  DBili  x   /  AST  59<H>  /  ALT  23  /  AlkPhos  108  07-09      Urinalysis Basic - ( 09 Jul 2024 12:23 )    Color: x / Appearance: x / SG: x / pH: x  Gluc: 91 mg/dL / Ketone: x  / Bili: x / Urobili: x   Blood: x / Protein: x / Nitrite: x   Leuk Esterase: x / RBC: x / WBC x   Sq Epi: x / Non Sq Epi: x / Bacteria: x

## 2024-07-09 NOTE — CONSULT NOTE ADULT - ASSESSMENT
Pt is a 54yo Male who presents with facial swelling for one day - presumed angioedema 2* Lisinopril.    ·	given Benadryl, Epi, Famotidine and Methylprednisolone  ·	cont to observe in ED -> if no improvement, recommend admission  ·	D/C Lisinopril  ·	w/d with attng, d/w ED

## 2024-07-09 NOTE — CONSULT NOTE ADULT - ASSESSMENT
IMPRESSION:    Angioedema without urticaria, worsening - suspected Lisinopril induced  No evidence of anaphylaxis such as respiratory compromise, hypotension, persistent gastrointestinal symptoms.  COPD, stable  Active smoker  HO HTN      PLAN:    CNS: Avoid depressants. Takes OTC Kratom - monitor for withdrawal.    HEENT: Oral care.  No excessive secretions.  S/p Benadryl, Epi, Pepcid and Methylprednisolone.  CW Pepcid, Benadryl and Solumedrol daily.  ENT eval noted - no laryngeal edema.  Tryptase level and C1 esterase testing.  Give 1g TXA now and consider FFP if Angioedema continues to worsen.    PULMONARY:  HOB @ 45 degrees.  Aspiration precautions.  On RA.  Albuterol PRN    CARDIOVASCULAR: TTE.  Even balance.  DC Lisinopril.    GI: GI prophylaxis w/ Pepcid.  NPO for now.  Bowel regimen PRN    RENAL:  Follow up lytes.  Correct as needed    INFECTIOUS DISEASE: Monitor off Abx    HEMATOLOGICAL:  DVT prophylaxis.    ENDOCRINE:  Follow up FS.  Insulin protocol if needed.    MUSCULOSKELETAL: IAT    Dispo:         IMPRESSION:    Angioedema without urticaria, worsening - suspected Lisinopril induced  No evidence of airway compromise at this point   COPD, stable  Active smoker  HO HTN      PLAN:    CNS: Avoid depressants. Takes OTC Kratom - monitor for withdrawal.    HEENT: Oral care.  No excessive secretions.  S/p Benadryl, Epi, Pepcid and Methylprednisolone.  CW Pepcid, Benadryl and Solumedrol daily.  ENT eval noted - no laryngeal edema.  Tryptase level and C1 esterase testing.  Give 1g TXA now and consider FFP if Angioedema continues to worsen.    PULMONARY:  HOB @ 45 degrees.  Aspiration precautions.  On RA.  Albuterol PRN    CARDIOVASCULAR: Even balance.  DC Lisinopril.  BP control.  Can use Amlodipine     GI: GI prophylaxis w/ Pepcid.  Clears for now.  Bowel regimen PRN    RENAL:  Follow up lytes.  Correct as needed    INFECTIOUS DISEASE: Monitor off Abx    HEMATOLOGICAL:  DVT prophylaxis.      ENDOCRINE:  Follow up FS.  Insulin protocol if needed.    MUSCULOSKELETAL: IAT    Dispo:  MICu

## 2024-07-09 NOTE — ED PROVIDER NOTE - ATTENDING CONTRIBUTION TO CARE
Patient with history of hypertension on lisinopril ate some sardines yesterday which she is normally hide developed some swelling to the lower lip which this morning has expanded and included the left side of his throat.  Denies any voice changes but does have some discomfort with swallowing.  The right lip is more swollen this morning.  Denies any rash shortness of breath nausea or vomiting or diarrhea.  Denies any breathing problems.  On exam there is no stridor.  The lungs are clear.  There is no skin changes.  There is mucosal swelling of the right side of the mouth and cheek.  The tongue is intact.  The left pharynx appears to be edematous.  He is maintaining his airway.  Plan will be to obtain labs provide antihistamines and epinephrine for treatment and discussed with ENT for scope

## 2024-07-10 ENCOUNTER — TRANSCRIPTION ENCOUNTER (OUTPATIENT)
Age: 54
End: 2024-07-10

## 2024-07-10 VITALS
SYSTOLIC BLOOD PRESSURE: 125 MMHG | RESPIRATION RATE: 13 BRPM | HEART RATE: 90 BPM | DIASTOLIC BLOOD PRESSURE: 79 MMHG | OXYGEN SATURATION: 94 %

## 2024-07-10 LAB
ALBUMIN SERPL ELPH-MCNC: 4.4 G/DL — SIGNIFICANT CHANGE UP (ref 3.5–5.2)
ALP SERPL-CCNC: 107 U/L — SIGNIFICANT CHANGE UP (ref 30–115)
ALT FLD-CCNC: 24 U/L — SIGNIFICANT CHANGE UP (ref 0–41)
ANION GAP SERPL CALC-SCNC: 15 MMOL/L — HIGH (ref 7–14)
ANION GAP SERPL CALC-SCNC: 18 MMOL/L — HIGH (ref 7–14)
AST SERPL-CCNC: 50 U/L — HIGH (ref 0–41)
BASOPHILS # BLD AUTO: 0.02 K/UL — SIGNIFICANT CHANGE UP (ref 0–0.2)
BASOPHILS NFR BLD AUTO: 0.1 % — SIGNIFICANT CHANGE UP (ref 0–1)
BILIRUB SERPL-MCNC: 0.8 MG/DL — SIGNIFICANT CHANGE UP (ref 0.2–1.2)
BUN SERPL-MCNC: 16 MG/DL — SIGNIFICANT CHANGE UP (ref 10–20)
BUN SERPL-MCNC: 22 MG/DL — HIGH (ref 10–20)
CALCIUM SERPL-MCNC: 10.2 MG/DL — SIGNIFICANT CHANGE UP (ref 8.4–10.4)
CALCIUM SERPL-MCNC: 9.8 MG/DL — SIGNIFICANT CHANGE UP (ref 8.4–10.4)
CHLORIDE SERPL-SCNC: 94 MMOL/L — LOW (ref 98–110)
CHLORIDE SERPL-SCNC: 95 MMOL/L — LOW (ref 98–110)
CO2 SERPL-SCNC: 22 MMOL/L — SIGNIFICANT CHANGE UP (ref 17–32)
CO2 SERPL-SCNC: 26 MMOL/L — SIGNIFICANT CHANGE UP (ref 17–32)
CREAT SERPL-MCNC: 0.9 MG/DL — SIGNIFICANT CHANGE UP (ref 0.7–1.5)
CREAT SERPL-MCNC: 0.9 MG/DL — SIGNIFICANT CHANGE UP (ref 0.7–1.5)
EGFR: 101 ML/MIN/1.73M2 — SIGNIFICANT CHANGE UP
EGFR: 101 ML/MIN/1.73M2 — SIGNIFICANT CHANGE UP
EOSINOPHIL # BLD AUTO: 0 K/UL — SIGNIFICANT CHANGE UP (ref 0–0.7)
EOSINOPHIL NFR BLD AUTO: 0 % — SIGNIFICANT CHANGE UP (ref 0–8)
GLUCOSE BLDC GLUCOMTR-MCNC: 150 MG/DL — HIGH (ref 70–99)
GLUCOSE SERPL-MCNC: 131 MG/DL — HIGH (ref 70–99)
GLUCOSE SERPL-MCNC: 144 MG/DL — HIGH (ref 70–99)
HCT VFR BLD CALC: 39.5 % — LOW (ref 42–52)
HGB BLD-MCNC: 13.5 G/DL — LOW (ref 14–18)
IMM GRANULOCYTES NFR BLD AUTO: 0.7 % — HIGH (ref 0.1–0.3)
LYMPHOCYTES # BLD AUTO: 0.78 K/UL — LOW (ref 1.2–3.4)
LYMPHOCYTES # BLD AUTO: 5.8 % — LOW (ref 20.5–51.1)
MAGNESIUM SERPL-MCNC: 1.8 MG/DL — SIGNIFICANT CHANGE UP (ref 1.8–2.4)
MAGNESIUM SERPL-MCNC: 1.9 MG/DL — SIGNIFICANT CHANGE UP (ref 1.8–2.4)
MCHC RBC-ENTMCNC: 32.2 PG — HIGH (ref 27–31)
MCHC RBC-ENTMCNC: 34.2 G/DL — SIGNIFICANT CHANGE UP (ref 32–37)
MCV RBC AUTO: 94.3 FL — HIGH (ref 80–94)
MONOCYTES # BLD AUTO: 0.26 K/UL — SIGNIFICANT CHANGE UP (ref 0.1–0.6)
MONOCYTES NFR BLD AUTO: 1.9 % — SIGNIFICANT CHANGE UP (ref 1.7–9.3)
NEUTROPHILS # BLD AUTO: 12.33 K/UL — HIGH (ref 1.4–6.5)
NEUTROPHILS NFR BLD AUTO: 91.5 % — HIGH (ref 42.2–75.2)
NRBC # BLD: 0 /100 WBCS — SIGNIFICANT CHANGE UP (ref 0–0)
PHOSPHATE SERPL-MCNC: 4.1 MG/DL — SIGNIFICANT CHANGE UP (ref 2.1–4.9)
PLATELET # BLD AUTO: 270 K/UL — SIGNIFICANT CHANGE UP (ref 130–400)
PMV BLD: 9.2 FL — SIGNIFICANT CHANGE UP (ref 7.4–10.4)
POTASSIUM SERPL-MCNC: 4.2 MMOL/L — SIGNIFICANT CHANGE UP (ref 3.5–5)
POTASSIUM SERPL-MCNC: 5.4 MMOL/L — HIGH (ref 3.5–5)
POTASSIUM SERPL-SCNC: 4.2 MMOL/L — SIGNIFICANT CHANGE UP (ref 3.5–5)
POTASSIUM SERPL-SCNC: 5.4 MMOL/L — HIGH (ref 3.5–5)
PROT SERPL-MCNC: 7.2 G/DL — SIGNIFICANT CHANGE UP (ref 6–8)
RBC # BLD: 4.19 M/UL — LOW (ref 4.7–6.1)
RBC # FLD: 13.3 % — SIGNIFICANT CHANGE UP (ref 11.5–14.5)
SODIUM SERPL-SCNC: 135 MMOL/L — SIGNIFICANT CHANGE UP (ref 135–146)
SODIUM SERPL-SCNC: 135 MMOL/L — SIGNIFICANT CHANGE UP (ref 135–146)
WBC # BLD: 13.48 K/UL — HIGH (ref 4.8–10.8)
WBC # FLD AUTO: 13.48 K/UL — HIGH (ref 4.8–10.8)

## 2024-07-10 PROCEDURE — 71045 X-RAY EXAM CHEST 1 VIEW: CPT | Mod: 26

## 2024-07-10 PROCEDURE — 99233 SBSQ HOSP IP/OBS HIGH 50: CPT

## 2024-07-10 PROCEDURE — 99232 SBSQ HOSP IP/OBS MODERATE 35: CPT

## 2024-07-10 RX ORDER — THIAMINE HCL 100 MG
100 TABLET ORAL DAILY
Refills: 0 | Status: DISCONTINUED | OUTPATIENT
Start: 2024-07-10 | End: 2024-07-10

## 2024-07-10 RX ORDER — BUPRENORPHINE AND NALOXONE 12; 3 MG/1; MG/1
1 FILM BUCCAL; SUBLINGUAL ONCE
Refills: 0 | Status: DISCONTINUED | OUTPATIENT
Start: 2024-07-10 | End: 2024-07-10

## 2024-07-10 RX ORDER — METHYLPREDNISOLONE ACETATE 20 MG/ML
40 VIAL (ML) INJECTION EVERY 24 HOURS
Refills: 0 | Status: DISCONTINUED | OUTPATIENT
Start: 2024-07-11 | End: 2024-07-10

## 2024-07-10 RX ORDER — FOLIC ACID
1 POWDER (GRAM) MISCELLANEOUS DAILY
Refills: 0 | Status: DISCONTINUED | OUTPATIENT
Start: 2024-07-10 | End: 2024-07-10

## 2024-07-10 RX ORDER — LISINOPRIL 5 MG/1
1 TABLET ORAL
Refills: 0 | DISCHARGE

## 2024-07-10 RX ORDER — SODIUM ZIRCONIUM CYCLOSILICATE 10 G/10G
5 POWDER, FOR SUSPENSION ORAL ONCE
Refills: 0 | Status: DISCONTINUED | OUTPATIENT
Start: 2024-07-10 | End: 2024-07-10

## 2024-07-10 RX ORDER — AMLODIPINE BESYLATE 2.5 MG/1
1 TABLET ORAL
Qty: 0 | Refills: 0 | DISCHARGE

## 2024-07-10 RX ORDER — AMLODIPINE BESYLATE 2.5 MG/1
5 TABLET ORAL ONCE
Refills: 0 | Status: COMPLETED | OUTPATIENT
Start: 2024-07-10 | End: 2024-07-10

## 2024-07-10 RX ORDER — AMLODIPINE BESYLATE 2.5 MG/1
10 TABLET ORAL DAILY
Refills: 0 | Status: DISCONTINUED | OUTPATIENT
Start: 2024-07-11 | End: 2024-07-10

## 2024-07-10 RX ORDER — SODIUM ZIRCONIUM CYCLOSILICATE 10 G/10G
5 POWDER, FOR SUSPENSION ORAL ONCE
Refills: 0 | Status: COMPLETED | OUTPATIENT
Start: 2024-07-10 | End: 2024-07-10

## 2024-07-10 RX ADMIN — BUPRENORPHINE AND NALOXONE 1 TABLET(S): 12; 3 FILM BUCCAL; SUBLINGUAL at 11:17

## 2024-07-10 RX ADMIN — Medication 1 APPLICATION(S): at 09:38

## 2024-07-10 RX ADMIN — Medication 2 MILLIGRAM(S): at 15:37

## 2024-07-10 RX ADMIN — SODIUM ZIRCONIUM CYCLOSILICATE 5 GRAM(S): 10 POWDER, FOR SUSPENSION ORAL at 09:37

## 2024-07-10 RX ADMIN — Medication 1500 MILLIGRAM(S): at 12:03

## 2024-07-10 RX ADMIN — AMLODIPINE BESYLATE 5 MILLIGRAM(S): 2.5 TABLET ORAL at 05:42

## 2024-07-10 RX ADMIN — AMLODIPINE BESYLATE 5 MILLIGRAM(S): 2.5 TABLET ORAL at 09:38

## 2024-07-10 RX ADMIN — Medication 25 GRAM(S): at 05:42

## 2024-07-10 RX ADMIN — LABETALOL HYDROCHLORIDE 100 MILLIGRAM(S): 300 TABLET ORAL at 05:43

## 2024-07-10 NOTE — DISCHARGE NOTE PROVIDER - NSDCCPCAREPLAN_GEN_ALL_CORE_FT
PRINCIPAL DISCHARGE DIAGNOSIS  Diagnosis: Angioedema  Assessment and Plan of Treatment:   - Likely secondary to lisinopril   - Imrpoved during hospital stay with solomederol, diphendydramine, TXA and famotidine  - Discontinue home lisinopril   - Do not take ACE inhibitors        SECONDARY DISCHARGE DIAGNOSES  Diagnosis: Hypertension  Assessment and Plan of Treatment:   -Continue labetalol 100mg BID  -Continue Amlodipine 5mg once daily    Diagnosis: Alcohol dependence with withdrawal  Assessment and Plan of Treatment:   - CIWA protol while inpatient  - Ativan 2mg IV PRN for CIWA>8 twice during hospital stay     PRINCIPAL DISCHARGE DIAGNOSIS  Diagnosis: Angioedema  Assessment and Plan of Treatment:   - Likely secondary to lisinopril   - Imrpoved during hospital stay with solomederol, diphendydramine, TXA and famotidine  - Discontinue home lisinopril   - Do not take ACE inhibitors  - You elected to leave AMA, you were cautioned about the risks including death  - Follow up withyour PCP        SECONDARY DISCHARGE DIAGNOSES  Diagnosis: Hypertension  Assessment and Plan of Treatment:   -Continue labetalol 100mg BID  -Continue Amlodipine 5mg once daily    Diagnosis: Alcohol dependence with withdrawal  Assessment and Plan of Treatment:   - CIWA protol while inpatient  - Ativan 2mg IV PRN for CIWA>8 twice during hospital stay

## 2024-07-10 NOTE — DISCHARGE NOTE PROVIDER - CARE PROVIDER_API CALL
Jack Bolden  Internal Medicine  [address] [place] [address]  Phone: [phone]  Fax: [phone]  Follow Up Time: 2 weeks

## 2024-07-10 NOTE — DISCHARGE NOTE PROVIDER - NSDCCPGOAL_GEN_ALL_CORE_FT
To get better and follow your care plan as instructed. To get better and follow your care plan as instructed.    Angioedema due to lisinopril

## 2024-07-10 NOTE — DISCHARGE NOTE PROVIDER - NSDCMRMEDTOKEN_GEN_ALL_CORE_FT
albuterol 90 mcg/inh inhalation aerosol: 2 puff(s) inhaled every 6 hours as needed for  shortness of breath and/or wheezing  amLODIPine 5 mg oral tablet: 1 tab(s) orally once a day  ibuprofen 600 mg oral tablet: 1 tab(s) orally 3 times a day  labetalol 100 mg oral tablet: 1 tab(s) orally 2 times a day  methocarbamol 750 mg oral tablet: 2 tab(s) orally 2 times a day

## 2024-07-10 NOTE — PROGRESS NOTE ADULT - ASSESSMENT
Impression:     Alcohol withdrawal - CIWA 5 today   Angioedema better   ACE inhibitor DC'd       PLAN:    CNS: Ativan symptom driven. Thiamine and folate.     HEENT: Oral care. Solumedrol 40mg daily .     PULMONARY:  HOB @ 45 degrees. CXR normal. LDCT outpatient.     CARDIOVASCULAR: Not on fluids or pressors. Labetalol and Amlodipine.     GI: Feeding: advance diet.     RENAL:  Follow up lytes.  Correct as needed. No villatoro.     INFECTIOUS DISEASE: Follow up cultures. Of fabx.     HEMATOLOGICAL:  DVT prophylaxis.    ENDOCRINE:  Follow up FS.  Insulin protocol if needed.    MUSCULOSKELETAL: out of bed.     Medical floor today.          Impression:     Alcohol withdrawal - CIWA 5 today   Angioedema better   ACE inhibitor DC'd       PLAN:    CNS: Ativan symptom driven. Thiamine and folate. Resume home suboxone.     HEENT: Oral care. Solumedrol 40mg daily .     PULMONARY:  HOB @ 45 degrees. CXR normal. LDCT outpatient.     CARDIOVASCULAR: Not on fluids or pressors. Labetalol and Amlodipine.     GI: Feeding: advance diet.     RENAL:  Follow up lytes.  Correct as needed. No villatoro.     INFECTIOUS DISEASE: Follow up cultures. Of fabx.     HEMATOLOGICAL:  DVT prophylaxis.    ENDOCRINE:  Follow up FS.  Insulin protocol if needed.    MUSCULOSKELETAL: out of bed.     Medical floor today.

## 2024-07-10 NOTE — PROGRESS NOTE ADULT - SUBJECTIVE AND OBJECTIVE BOX
ENT PROGRESS NOTE    Pt is a 53y M a/w facial swelling--presumed angioedema 2*Lisinopril. Seen at bedside today AM. Reports some improvement in swelling to lips. Also states voice sounds "a lot better" today.  Denies difficulty breathing/tolerating secretions.       REVIEW OF SYSTEMS   [x] A ten-point review of systems was otherwise negative except as noted.    Allergies  No Known Allergies      MEDICATIONS:  albuterol    90 MICROgram(s) HFA Inhaler 2 Puff(s) Inhalation every 6 hours PRN  amLODIPine   Tablet 5 milliGRAM(s) Oral once  chlorhexidine 2% Cloths 1 Application(s) Topical <User Schedule>  diphenhydrAMINE 25 milliGRAM(s) Oral every 4 hours PRN  enoxaparin Injectable 40 milliGRAM(s) SubCutaneous every 24 hours  ibuprofen  Tablet. 600 milliGRAM(s) Oral two times a day PRN  labetalol 100 milliGRAM(s) Oral two times a day  LORazepam   Injectable 2 milliGRAM(s) IV Push every 1 hour PRN  methocarbamol 1500 milliGRAM(s) Oral two times a day PRN  sodium zirconium cyclosilicate 5 Gram(s) Oral once  sodium zirconium cyclosilicate 5 Gram(s) Oral once      Vital Signs Last 24 Hrs  T(C): 36.4 (10 Jul 2024 06:00), Max: 37 (09 Jul 2024 11:49)  T(F): 97.5 (10 Jul 2024 06:00), Max: 98.6 (09 Jul 2024 11:49)  HR: 92 (10 Jul 2024 08:00) (90 - 114)  BP: 146/84 (10 Jul 2024 08:00) (130/72 - 195/91)  BP(mean): 109 (10 Jul 2024 08:00) (104 - 130)  RR: 20 (10 Jul 2024 08:00) (9 - 39)  SpO2: 95% (10 Jul 2024 08:00) (91% - 98%)    Parameters below as of 10 Jul 2024 08:00  Patient On (Oxygen Delivery Method): nasal cannula  O2 Flow (L/min): 2      07-09 @ 07:01  -  07-10 @ 07:00  --------------------------------------------------------  IN:    Oral Fluid: 280 mL  Total IN: 280 mL    OUT:    Voided (mL): 625 mL  Total OUT: 625 mL    Total NET: -345 mL      PHYSICAL EXAM:  GEN: NAD, awake and alert. No drooling or pooling of secretions. No stridor. Good vocal quality, no hoarseness.   HEENT: + right sided lower lip edema. no TTP over area, no fluctuance or erythema. Oral mucosa pink and moist. Mild edema noted to uvula. No erythema or edema noted to buccal mucosa, tongue, FOM.   RESP: Non-labored breathing  ABDO: Soft, NT  EXT: LAMBERT x 4      LABS:  CBC-                        13.5   13.48 )-----------( 270      ( 10 Jul 2024 04:28 )             39.5   
Patient is a 53y old  Male who presents with a chief complaint of facial swelling (09 Jul 2024 17:31)        Over Night Events:    CIWA of 5   Angioedema better         ROS:  See HPI    PHYSICAL EXAM    ICU Vital Signs Last 24 Hrs  T(C): 36.4 (10 Jul 2024 06:00), Max: 37 (09 Jul 2024 11:49)  T(F): 97.5 (10 Jul 2024 06:00), Max: 98.6 (09 Jul 2024 11:49)  HR: 92 (10 Jul 2024 08:00) (90 - 114)  BP: 146/84 (10 Jul 2024 08:00) (130/72 - 195/91)  BP(mean): 109 (10 Jul 2024 08:00) (104 - 130)  ABP: --  ABP(mean): --  RR: 20 (10 Jul 2024 08:00) (9 - 39)  SpO2: 95% (10 Jul 2024 08:00) (91% - 98%)    O2 Parameters below as of 10 Jul 2024 08:00  Patient On (Oxygen Delivery Method): nasal cannula  O2 Flow (L/min): 2          General: NAD  HEENT: LUCILA             Lymphatic system: No cervical LN   Lungs: Bilateral BS, clear   Cardiovascular: Regular   Gastrointestinal: Soft, Positive BS  Extremities: No clubbing.  Moves extremities.  Full Range of motion   Skin: Warm, intact  Neurological: No motor or sensory deficit       07-09-24 @ 07:01  -  07-10-24 @ 07:00  --------------------------------------------------------  IN:    Oral Fluid: 280 mL  Total IN: 280 mL    OUT:    Voided (mL): 625 mL  Total OUT: 625 mL    Total NET: -345 mL          LABS:                            13.5   13.48 )-----------( 270      ( 10 Jul 2024 04:28 )             39.5                                               07-10    135  |  95<L>  |  16  ----------------------------<  144<H>  5.4<H>   |  22  |  0.9    Ca    9.8      10 Jul 2024 04:28  Phos  4.1     07-10  Mg     1.8     07-10    TPro  7.2  /  Alb  4.4  /  TBili  0.8  /  DBili  x   /  AST  50<H>  /  ALT  24  /  AlkPhos  107  07-10                                             Urinalysis Basic - ( 10 Jul 2024 04:28 )    Color: x / Appearance: x / SG: x / pH: x  Gluc: 144 mg/dL / Ketone: x  / Bili: x / Urobili: x   Blood: x / Protein: x / Nitrite: x   Leuk Esterase: x / RBC: x / WBC x   Sq Epi: x / Non Sq Epi: x / Bacteria: x                                                  LIVER FUNCTIONS - ( 10 Jul 2024 04:28 )  Alb: 4.4 g/dL / Pro: 7.2 g/dL / ALK PHOS: 107 U/L / ALT: 24 U/L / AST: 50 U/L / GGT: x                                                                                                                                       MEDICATIONS  (STANDING):  amLODIPine   Tablet 5 milliGRAM(s) Oral daily  chlorhexidine 2% Cloths 1 Application(s) Topical <User Schedule>  enoxaparin Injectable 40 milliGRAM(s) SubCutaneous every 24 hours  famotidine Injectable 20 milliGRAM(s) IV Push daily  labetalol 100 milliGRAM(s) Oral two times a day  methylPREDNISolone sodium succinate Injectable 60 milliGRAM(s) IV Push every 24 hours  sodium zirconium cyclosilicate 5 Gram(s) Oral once    MEDICATIONS  (PRN):  albuterol    90 MICROgram(s) HFA Inhaler 2 Puff(s) Inhalation every 6 hours PRN for shortness of breath and/or wheezing  diphenhydrAMINE 25 milliGRAM(s) Oral every 4 hours PRN Rash and/or Itching  ibuprofen  Tablet. 600 milliGRAM(s) Oral two times a day PRN Moderate Pain (4 - 6)  LORazepam   Injectable 2 milliGRAM(s) IV Push every 1 hour PRN Symptom-triggered: each CIWA -Ar score 8 or GREATER  methocarbamol 1500 milliGRAM(s) Oral two times a day PRN Muscle Spasm      Xrays:                                                                                    ECHO

## 2024-07-10 NOTE — CHART NOTE - NSCHARTNOTEFT_GEN_A_CORE
Patient is a 53y old  Male who presents with a chief complaint of facial swelling (10 Jul 2024 09:27)    HPI:  This is a 52 y/o M with a PMHx of HTN on Lisinopril, COPD on albuterol PRN who presented to the ED for facial swelling. At 7AM felt a small blister in mouth and then after looked at the mirror and saw swelling in R cheek mouth and lips. Attempted some rinsing with peroxide and salt and didn't alleviate. When he realized how swollen, he came to the ED.    No chest pain, palpitations, chest pain, fevers, chills, NVDC, no dysuria, no skin rashes, no known insect bites. No pain in the cheek, mouth. No weakness, dizziness, tremors. Has recent congestion and cough productive with green colored sputum.  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 40 pack years of smoking, heavy alcohol use 6-8 9% beers daily. Drank a bottle of vodka yesterday before coming to the hospital. No drugs or marijuana use. Additionally, using kratom and occasionally suboxone for pain management. Takes lisinopril for 20 years.    Vitals - 131/ 86, HR 91, RR18, Temp 98.6, satting 97% on RA  Labs- WBC 10, Hgb 13.3, Na 132, K 4.3, Cr 0.8    In the ED, given benadryl 50, epi 0.3, pepcid 20, solumedrol 125, 1 L bolus, robaxin, motrin, buprenorphine/ naloxone, TXA,  (2024 17:31)       INTERVAL HPI/OVERNIGHT EVENTS:   No overnight events   Afebrile, hemodynamically stable     Subjective:  Pt was resting comfortably in his bed this morning. He feels his rt cheek and lips swelling has been improving. He denied any chest pain or SOB.     ICU Vital Signs Last 24 Hrs  T(C): 36.4 (10 Jul 2024 08:00), Max: 36.4 (2024 20:00)  T(F): 97.6 (10 Jul 2024 08:00), Max: 97.6 (10 Jul 2024 08:00)  HR: 98 (10 Jul 2024 11:00) (90 - 114)  BP: 148/85 (10 Jul 2024 11:00) (130/72 - 195/91)  BP(mean): 112 (10 Jul 2024 11:00) (104 - 130)  ABP: --  ABP(mean): --  RR: 11 (10 Jul 2024 11:00) (9 - 39)  SpO2: 94% (10 Jul 2024 11:00) (91% - 98%)    O2 Parameters below as of 10 Jul 2024 11:00  Patient On (Oxygen Delivery Method): nasal cannula  O2 Flow (L/min): 2      I&O's Summary    2024 07:01  -  10 Jul 2024 07:00  --------------------------------------------------------  IN: 280 mL / OUT: 625 mL / NET: -345 mL        Daily Height in cm: 172.72 (2024 17:20)    Daily Weight in k.5 (10 Jul 2024 05:00)    EKG/Telemetry Events: None    MEDICATIONS  (STANDING):  chlorhexidine 2% Cloths 1 Application(s) Topical <User Schedule>  enoxaparin Injectable 40 milliGRAM(s) SubCutaneous every 24 hours  labetalol 100 milliGRAM(s) Oral two times a day  sodium zirconium cyclosilicate 5 Gram(s) Oral once    MEDICATIONS  (PRN):  albuterol    90 MICROgram(s) HFA Inhaler 2 Puff(s) Inhalation every 6 hours PRN for shortness of breath and/or wheezing  diphenhydrAMINE 25 milliGRAM(s) Oral every 4 hours PRN Rash and/or Itching  ibuprofen  Tablet. 600 milliGRAM(s) Oral two times a day PRN Moderate Pain (4 - 6)  LORazepam   Injectable 2 milliGRAM(s) IV Push every 1 hour PRN Symptom-triggered: each CIWA -Ar score 8 or GREATER  methocarbamol 1500 milliGRAM(s) Oral two times a day PRN Muscle Spasm      PHYSICAL EXAM:  GENERAL: Resolving Rt sided swelling of cheek and b/l lips swelling  HEAD:  Atraumatic, Normocephalic  EYES: EOMI, PERRLA, conjunctiva and sclera clear  NECK: Supple, No JVD, Normal thyroid, no enlarged nodes  NERVOUS SYSTEM:  Alert & Awake.   CHEST/LUNG: B/L good air entry; No rales, rhonchi, or wheezing  HEART: S1S2 normal, no S3, Regular rate and rhythm; No murmurs  ABDOMEN: Soft, Nontender, Nondistended; Bowel sounds present  EXTREMITIES:  2+ Peripheral Pulses, No clubbing, cyanosis, or edema  LYMPH: No lymphadenopathy noted  SKIN: No rashes or lesions    LABS:                        13.5   13.48 )-----------( 270      ( 10 Jul 2024 04:28 )             39.5     07-10    135  |  95<L>  |  16  ----------------------------<  144<H>  5.4<H>   |  22  |  0.9    Ca    9.8      10 Jul 2024 04:28  Phos  4.1     07-10  Mg     1.8     07-10    TPro  7.2  /  Alb  4.4  /  TBili  0.8  /  DBili  x   /  AST  50<H>  /  ALT  24  /  AlkPhos  107  07-10    LIVER FUNCTIONS - ( 10 Jul 2024 04:28 )  Alb: 4.4 g/dL / Pro: 7.2 g/dL / ALK PHOS: 107 U/L / ALT: 24 U/L / AST: 50 U/L / GGT: x             CAPILLARY BLOOD GLUCOSE      POCT Blood Glucose.: 150 mg/dL (10 Jul 2024 07:29)  POCT Blood Glucose.: 104 mg/dL (2024 17:22)            Urinalysis Basic - ( 10 Jul 2024 04:28 )    Color: x / Appearance: x / SG: x / pH: x  Gluc: 144 mg/dL / Ketone: x  / Bili: x / Urobili: x   Blood: x / Protein: x / Nitrite: x   Leuk Esterase: x / RBC: x / WBC x   Sq Epi: x / Non Sq Epi: x / Bacteria: x          RADIOLOGY & ADDITIONAL TESTS:  CXR: (7/10)  Support devices: None.    Cardiac/mediastinum/hilum: Unremarkable.    Lung parenchyma/Pleura: Right basilar linear subsegmental atelectasis. No   other focal parenchymal opacities, pleural effusions or pneumothorax.    Skeleton/soft tissues: Unchanged.        Care Discussed with Consultants/Other Providers [ x] YES  [ ] NO    CCU Course  Pt was elevated to the CCU with diagnosis of angioedema. His lisinopril was held, while amlodipine and labetalol were continued for BP control.  In the CCU he continued to receive Pepcid, Benadryl and Solumedrol 40mg daily. He was given 1g of TXA and FFP was a consideration in event of worsening angioedema. His angioedema has improved. ENT eval noted - no laryngeal edema. His amlodipine was increased to 10mg QD for improved BP control. Pt was also noted to have alcohol withdrawal. He was put on q4hr CIWA with lorazepam IV 2mg given for CIWA>8.      Impression:     Alcohol withdrawal - CIWA 5 today   Angioedema better   ACE inhibitor DC'd       PLAN:    CNS: Ativan symptom driven. Thiamine and folate. Resume home suboxone.     HEENT: Oral care. Solumedrol 40mg daily .     PULMONARY:  HOB @ 45 degrees. CXR normal. LDCT outpatient.     CARDIOVASCULAR: Not on fluids or pressors. Labetalol and Amlodipine.     GI: Feeding: advance diet.     RENAL:  Follow up lytes.  Correct as needed. No villatoro.     INFECTIOUS DISEASE: Follow up cultures. Of fabx.     HEMATOLOGICAL:  DVT prophylaxis.    ENDOCRINE:  Follow up FS.  Insulin protocol if needed.    MUSCULOSKELETAL: out of bed.     Medical floor today. Discharge to medical floor      Patient is a 53y old  Male who presents with a chief complaint of facial swelling (10 Jul 2024 09:27)    HPI:  This is a 52 y/o M with a PMHx of HTN on Lisinopril, COPD on albuterol PRN who presented to the ED for facial swelling. At 7AM felt a small blister in mouth and then after looked at the mirror and saw swelling in R cheek mouth and lips. Attempted some rinsing with peroxide and salt and didn't alleviate. When he realized how swollen, he came to the ED.    No chest pain, palpitations, chest pain, fevers, chills, NVDC, no dysuria, no skin rashes, no known insect bites. No pain in the cheek, mouth. No weakness, dizziness, tremors. Has recent congestion and cough productive with green colored sputum.  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 40 pack years of smoking, heavy alcohol use 6-8 9% beers daily. Drank a bottle of vodka yesterday before coming to the hospital. No drugs or marijuana use. Additionally, using kratom and occasionally suboxone for pain management. Takes lisinopril for 20 years.    Vitals - 131/ 86, HR 91, RR18, Temp 98.6, satting 97% on RA  Labs- WBC 10, Hgb 13.3, Na 132, K 4.3, Cr 0.8    In the ED, given benadryl 50, epi 0.3, pepcid 20, solumedrol 125, 1 L bolus, robaxin, motrin, buprenorphine/ naloxone, TXA,  (09 Jul 2024 17:31)       INTERVAL HPI/OVERNIGHT EVENTS:   No overnight events   Afebrile, hemodynamically stable     Subjective:  Pt was resting comfortably in his bed this morning. He feels his rt cheek and lips swelling has improved. He denied any chest pain or SOB.     ICU Vital Signs Last 24 Hrs  T(C): 36.4 (10 Jul 2024 08:00), Max: 36.4 (09 Jul 2024 20:00)  T(F): 97.6 (10 Jul 2024 08:00), Max: 97.6 (10 Jul 2024 08:00)  HR: 98 (10 Jul 2024 11:00) (90 - 114)  BP: 148/85 (10 Jul 2024 11:00) (130/72 - 195/91)  BP(mean): 112 (10 Jul 2024 11:00) (104 - 130)  RR: 11 (10 Jul 2024 11:00) (9 - 39)  SpO2: 94% (10 Jul 2024 11:00) (91% - 98%)    O2 Parameters below as of 10 Jul 2024 11:00  Patient On (Oxygen Delivery Method): nasal cannula  O2 Flow (L/min): 2      EKG/Telemetry Events: None    MEDICATIONS  (STANDING):  chlorhexidine 2% Cloths 1 Application(s) Topical <User Schedule>  enoxaparin Injectable 40 milliGRAM(s) SubCutaneous every 24 hours  labetalol 100 milliGRAM(s) Oral two times a day  sodium zirconium cyclosilicate 5 Gram(s) Oral once    MEDICATIONS  (PRN):  albuterol    90 MICROgram(s) HFA Inhaler 2 Puff(s) Inhalation every 6 hours PRN for shortness of breath and/or wheezing  diphenhydrAMINE 25 milliGRAM(s) Oral every 4 hours PRN Rash and/or Itching  ibuprofen  Tablet. 600 milliGRAM(s) Oral two times a day PRN Moderate Pain (4 - 6)  LORazepam   Injectable 2 milliGRAM(s) IV Push every 1 hour PRN Symptom-triggered: each CIWA -Ar score 8 or GREATER  methocarbamol 1500 milliGRAM(s) Oral two times a day PRN Muscle Spasm      PHYSICAL EXAM:  GENERAL: Resolving Rt sided swelling of cheek and b/l lips swelling  HEAD:  Atraumatic, Normocephalic  EYES: EOMI, PERRLA, conjunctiva and sclera clear  NECK: Supple, No JVD, Normal thyroid, no enlarged nodes  NERVOUS SYSTEM:  Alert & Awake.   CHEST/LUNG: B/L good air entry; No rales, rhonchi, or wheezing  HEART: S1S2 normal, no S3, Regular rate and rhythm; No murmurs  ABDOMEN: Soft, Nontender, Nondistended; Bowel sounds present  EXTREMITIES:  2+ Peripheral Pulses, No clubbing, cyanosis, or edema  LYMPH: No lymphadenopathy noted  SKIN: No rashes or lesions      Hospital Course  In the ED, given benadryl 50, epi 0.3, pepcid 20, solumedrol 125, 1L LR bolus, robaxin, motrin, buprenorphine/naloxone, TXA,  (09 Jul 2024 17:31). Pt was elevated to the CCU with diagnosis of angioedema. His lisinopril was held, while amlodipine and labetalol were continued for BP control.  In the CCU he continued to receive Pepcid, Benadryl and Solumedrol 40mg daily. He was given 1g of TXA and FFP was a consideration in event of worsening angioedema. ENT eval noted - no laryngeal edema. His amlodipine was increased to 10mg QD for improved BP control. Pt was also noted to have alcohol withdrawal. He was put on q4hr CIWA protocol with lorazepam IV 2mg given twice for CIWA>8.  His angioedema has improved and he is stable to be discharged to medical floor.       Impression:     #Angioedema    #Hypertension  #Alcohol withdrawal - CIWA 5 today     PLAN:    #Angioedema  - lisinopril discontinued  - TXA 1g given  - FFP if angioedema worsens  - Continue Solumederol 40mg IV once daily  - Continue diphenhydramine 25mg PO q4hrs PRN    #Alcohol Withdrawal   - CIWA q4hrs  - Ativan IV 2mg PRN for CIWA > 8  - Thiamine and Folate    #HTN  - Continue amlodipine 10mg PO once daily  - Continue labetalol 100mg PO BID    CNS: Ativan symptom driven. Thiamine and folate. Resume home suboxone.     HEENT: Oral care. Solumedrol 40mg daily .     PULMONARY:  HOB @ 45 degrees. CXR normal. LDCT outpatient.     CARDIOVASCULAR: Not on fluids or pressors. Labetalol and Amlodipine.     GI: Feeding: advance diet.     RENAL:  Follow up lytes.  Correct as needed. No villatoro.     INFECTIOUS DISEASE:  Off abx.     HEMATOLOGICAL:  DVT prophylaxis.    ENDOCRINE:  Follow up FS.  Insulin protocol if needed.    MUSCULOSKELETAL: out of bed.     discharge to medical floor today. Discharge to medical floor      Patient is a 53y old  Male who presents with a chief complaint of facial swelling (10 Jul 2024 09:27)    HPI:  This is a 54 y/o M with a PMHx of HTN on Lisinopril, COPD on albuterol PRN who presented to the ED for facial swelling. At 7AM felt a small blister in mouth and then after looked at the mirror and saw swelling in R cheek mouth and lips. Attempted some rinsing with peroxide and salt and didn't alleviate. When he realized how swollen, he came to the ED.    No chest pain, palpitations, chest pain, fevers, chills, NVDC, no dysuria, no skin rashes, no known insect bites. No pain in the cheek, mouth. No weakness, dizziness, tremors. Has recent congestion and cough productive with green colored sputum.  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 40 pack years of smoking, heavy alcohol use 6-8 9% beers daily. Drank a bottle of vodka yesterday before coming to the hospital. No drugs or marijuana use. Additionally, using kratom and occasionally suboxone for pain management. Takes lisinopril for 20 years.    Vitals - 131/ 86, HR 91, RR18, Temp 98.6, satting 97% on RA  Labs- WBC 10, Hgb 13.3, Na 132, K 4.3, Cr 0.8    In the ED, given benadryl 50, epi 0.3, pepcid 20, solumedrol 125, 1 L bolus, robaxin, motrin, buprenorphine/ naloxone, TXA,  (09 Jul 2024 17:31)       INTERVAL HPI/OVERNIGHT EVENTS:   No overnight events   Afebrile, hemodynamically stable     Subjective:  Pt was resting comfortably in his bed this morning. He feels his rt cheek and lips swelling has improved. He denied any chest pain or SOB.     ICU Vital Signs Last 24 Hrs  T(C): 36.4 (10 Jul 2024 08:00), Max: 36.4 (09 Jul 2024 20:00)  T(F): 97.6 (10 Jul 2024 08:00), Max: 97.6 (10 Jul 2024 08:00)  HR: 98 (10 Jul 2024 11:00) (90 - 114)  BP: 148/85 (10 Jul 2024 11:00) (130/72 - 195/91)  BP(mean): 112 (10 Jul 2024 11:00) (104 - 130)  RR: 11 (10 Jul 2024 11:00) (9 - 39)  SpO2: 94% (10 Jul 2024 11:00) (91% - 98%)    O2 Parameters below as of 10 Jul 2024 11:00  Patient On (Oxygen Delivery Method): nasal cannula  O2 Flow (L/min): 2      EKG/Telemetry Events: None    MEDICATIONS  (STANDING):  chlorhexidine 2% Cloths 1 Application(s) Topical <User Schedule>  enoxaparin Injectable 40 milliGRAM(s) SubCutaneous every 24 hours  labetalol 100 milliGRAM(s) Oral two times a day  sodium zirconium cyclosilicate 5 Gram(s) Oral once    MEDICATIONS  (PRN):  albuterol    90 MICROgram(s) HFA Inhaler 2 Puff(s) Inhalation every 6 hours PRN for shortness of breath and/or wheezing  diphenhydrAMINE 25 milliGRAM(s) Oral every 4 hours PRN Rash and/or Itching  ibuprofen  Tablet. 600 milliGRAM(s) Oral two times a day PRN Moderate Pain (4 - 6)  LORazepam   Injectable 2 milliGRAM(s) IV Push every 1 hour PRN Symptom-triggered: each CIWA -Ar score 8 or GREATER  methocarbamol 1500 milliGRAM(s) Oral two times a day PRN Muscle Spasm      PHYSICAL EXAM:  GENERAL: Resolving Rt sided swelling of cheek and b/l lips swelling  HEAD:  Atraumatic, Normocephalic  EYES: EOMI, PERRLA, conjunctiva and sclera clear  NECK: Supple, No JVD, Normal thyroid, no enlarged nodes  NERVOUS SYSTEM:  Alert & Awake.   CHEST/LUNG: B/L good air entry; No rales, rhonchi, or wheezing  HEART: S1S2 normal, no S3, Regular rate and rhythm; No murmurs  ABDOMEN: Soft, Nontender, Nondistended; Bowel sounds present  EXTREMITIES:  2+ Peripheral Pulses, No clubbing, cyanosis, or edema  LYMPH: No lymphadenopathy noted  SKIN: No rashes or lesions      Hospital Course  In the ED, given benadryl 50, epi 0.3, pepcid 20, solumedrol 125, 1L LR bolus, robaxin, motrin, buprenorphine/naloxone, TXA,  (09 Jul 2024 17:31). Pt was elevated to the CCU with diagnosis of angioedema. His lisinopril was held, while amlodipine and labetalol were continued for BP control.  In the CCU he continued to receive Pepcid, Benadryl and Solumedrol 40mg daily. He was given 1g of TXA and FFP was a consideration in event of worsening angioedema. ENT eval noted - no laryngeal edema. His amlodipine was increased to 10mg QD for improved BP control. Pt was also noted to have alcohol withdrawal. He was put on q4hr CIWA protocol with lorazepam IV 2mg given twice for CIWA>8.  His angioedema has improved and he is stable to be discharged to medical floor.       Impression:     #Angioedema    #Hypertension  #Alcohol withdrawal - CIWA 5 today     PLAN:    #Angioedema  - lisinopril discontinued  - TXA 1g given  - FFP if angioedema worsens  - Continue Solumederol 40mg IV once daily  - Continue diphenhydramine 25mg PO q4hrs PRN    #Alcohol Withdrawal   - CIWA q4hrs  - Ativan IV 2mg PRN for CIWA > 8  - Thiamine and Folate    #HTN  - Continue amlodipine 10mg PO once daily  - Continue labetalol 100mg PO BID    #Lung CA screening  - Low dose CT scan for lung cancer screening    CNS: Ativan symptom driven. Thiamine and folate. Resume home suboxone.     HEENT: Oral care. Solumedrol 40mg daily .     PULMONARY:  HOB @ 45 degrees. CXR normal. LDCT outpatient.     CARDIOVASCULAR: Not on fluids or pressors. Labetalol and Amlodipine.     GI: Feeding: advance diet.     RENAL:  Follow up lytes.  Correct as needed. No villatoro.     INFECTIOUS DISEASE:  Off abx.     HEMATOLOGICAL:  DVT prophylaxis.    ENDOCRINE:  Follow up FS.  Insulin protocol if needed.    MUSCULOSKELETAL: out of bed.     discharge to medical floor today.

## 2024-07-10 NOTE — DISCHARGE NOTE PROVIDER - CARE PROVIDERS DIRECT ADDRESSES
[email or web address]

## 2024-07-10 NOTE — DISCHARGE NOTE PROVIDER - HOSPITAL COURSE
This is a 54 y/o M with a PMHx of HTN on Lisinopril, COPD on albuterol PRN who presented to the ED for facial swelling. No chest pain, palpitations, chest pain, fevers, chills, NVDC, no dysuria, no skin rashes, no known insect bites. No pain in the cheek, mouth. No weakness, dizziness, tremors. Has recent congestion and cough productive with green colored sputum. ED Vitals - 131/ 86, HR 91, RR18, Temp 98.6, satting 97% on RA. In the ED, given benadryl 50, epi 0.3, pepcid 20, solumedrol 125, 1L LR bolus, robaxin, motrin, buprenorphine/naloxone, TXA,  (09 Jul 2024 17:31). Pt was elevated to the CCU with diagnosis of angioedema. His lisinopril was held, while amlodipine and labetalol were continued for BP control.  In the CCU he continued to receive Pepcid, Benadryl and Solumedrol 40mg daily. He was given 1g of TXA and FFP was a consideration in event of worsening angioedema. ENT eval noted - no laryngeal edema. His amlodipine was increased to 10mg QD for improved BP control. Pt was also noted to have alcohol withdrawal. He was put on q4hr CIWA protocol with lorazepam IV 2mg given twice for CIWA>8.  His angioedema improved significantly and he was stable to be discharged to the medical floor when he decided to leave against medical advice.  Pt was cautioned about risks of leaving against medical advice including death. Pt still decided to leave AMA and signed the paperwork. Pt advised to return to the hospital in case of worsening health.

## 2024-07-10 NOTE — PROGRESS NOTE ADULT - ASSESSMENT
Pt is a 53y M a/w facial swelling--presumed angioedema 2* Lisinopril.    ·	Cont with steroids for now, can start to taper  ·	Cont with H1/H2 block   ·	Diet as per SLP  ·	will d/w attng

## 2024-07-11 LAB — TRYPTASE SERPL-MCNC: 5.7 UG/L — SIGNIFICANT CHANGE UP

## 2024-07-12 LAB — C1INH FUNCTIONAL/C1INH TOTAL MFR SERPL: 40 MG/DL — HIGH (ref 21–39)

## 2024-07-17 DIAGNOSIS — I10 ESSENTIAL (PRIMARY) HYPERTENSION: ICD-10-CM

## 2024-07-17 DIAGNOSIS — F17.200 NICOTINE DEPENDENCE, UNSPECIFIED, UNCOMPLICATED: ICD-10-CM
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
## 2024-07-17 DIAGNOSIS — J44.9 CHRONIC OBSTRUCTIVE PULMONARY DISEASE, UNSPECIFIED: ICD-10-CM

## 2024-07-17 DIAGNOSIS — F10.20 ALCOHOL DEPENDENCE, UNCOMPLICATED: ICD-10-CM

## 2024-07-17 DIAGNOSIS — Z79.1 LONG TERM (CURRENT) USE OF NON-STEROIDAL ANTI-INFLAMMATORIES (NSAID): ICD-10-CM

## 2024-07-17 DIAGNOSIS — T78.3XXA ANGIONEUROTIC EDEMA, INITIAL ENCOUNTER: ICD-10-CM

## 2024-07-17 DIAGNOSIS — Z79.899 OTHER LONG TERM (CURRENT) DRUG THERAPY: ICD-10-CM

## 2024-07-17 DIAGNOSIS — T46.4X5A ADVERSE EFFECT OF ANGIOTENSIN-CONVERTING-ENZYME INHIBITORS, INITIAL ENCOUNTER: ICD-10-CM

## 2024-07-17 LAB — C1INH FUNCTIONAL FLD-MCNC: >110 — SIGNIFICANT CHANGE UP
